# Patient Record
Sex: FEMALE | Race: BLACK OR AFRICAN AMERICAN | Employment: FULL TIME | URBAN - METROPOLITAN AREA
[De-identification: names, ages, dates, MRNs, and addresses within clinical notes are randomized per-mention and may not be internally consistent; named-entity substitution may affect disease eponyms.]

---

## 2018-06-24 ENCOUNTER — HOSPITAL ENCOUNTER (EMERGENCY)
Age: 38
Discharge: HOME | End: 2018-06-24
Payer: COMMERCIAL

## 2018-06-24 VITALS
HEART RATE: 111 BPM | RESPIRATION RATE: 18 BRPM | DIASTOLIC BLOOD PRESSURE: 106 MMHG | OXYGEN SATURATION: 96 % | SYSTOLIC BLOOD PRESSURE: 150 MMHG | BODY MASS INDEX: 32.25 KG/M2 | WEIGHT: 160 LBS | TEMPERATURE: 98 F | HEIGHT: 59 IN

## 2018-06-24 DIAGNOSIS — K08.89 TOOTHACHE: Primary | ICD-10-CM

## 2018-06-24 PROCEDURE — 6370000000 HC RX 637 (ALT 250 FOR IP): Performed by: NURSE PRACTITIONER

## 2018-06-24 PROCEDURE — 99282 EMERGENCY DEPT VISIT SF MDM: CPT

## 2018-06-24 RX ORDER — IBUPROFEN 800 MG/1
800 TABLET ORAL EVERY 8 HOURS PRN
Qty: 21 TABLET | Refills: 0 | Status: ON HOLD | OUTPATIENT
Start: 2018-06-24 | End: 2018-07-26 | Stop reason: HOSPADM

## 2018-06-24 RX ORDER — HYDROCODONE BITARTRATE AND ACETAMINOPHEN 5; 325 MG/1; MG/1
1 TABLET ORAL ONCE
Status: COMPLETED | OUTPATIENT
Start: 2018-06-24 | End: 2018-06-24

## 2018-06-24 RX ADMIN — HYDROCODONE BITARTRATE AND ACETAMINOPHEN 1 TABLET: 5; 325 TABLET ORAL at 13:36

## 2018-06-24 ASSESSMENT — PAIN SCALES - GENERAL
PAINLEVEL_OUTOF10: 8
PAINLEVEL_OUTOF10: 10

## 2018-06-24 ASSESSMENT — PAIN DESCRIPTION - ORIENTATION: ORIENTATION: RIGHT

## 2018-06-24 ASSESSMENT — PAIN DESCRIPTION - LOCATION: LOCATION: TEETH

## 2018-06-24 ASSESSMENT — PAIN DESCRIPTION - PAIN TYPE: TYPE: ACUTE PAIN

## 2018-07-25 ENCOUNTER — HOSPITAL ENCOUNTER (INPATIENT)
Age: 38
LOS: 1 days | Discharge: HOME | DRG: 425 | End: 2018-07-26
Attending: EMERGENCY MEDICINE | Admitting: INTERNAL MEDICINE
Payer: COMMERCIAL

## 2018-07-25 DIAGNOSIS — E87.6 HYPOKALEMIA: Primary | ICD-10-CM

## 2018-07-25 PROBLEM — R00.2 PALPITATIONS: Status: ACTIVE | Noted: 2018-07-25

## 2018-07-25 PROBLEM — R07.2 PRECORDIAL PAIN: Status: ACTIVE | Noted: 2018-07-25

## 2018-07-25 LAB
ANION GAP SERPL CALCULATED.3IONS-SCNC: 12 MMOL/L (ref 7–16)
BASOPHILS ABSOLUTE: 0.05 E9/L (ref 0–0.2)
BASOPHILS RELATIVE PERCENT: 0.5 % (ref 0–2)
BILIRUBIN URINE: NEGATIVE
BLOOD, URINE: NEGATIVE
BUN BLDV-MCNC: 8 MG/DL (ref 6–20)
CALCIUM SERPL-MCNC: 9.6 MG/DL (ref 8.6–10.2)
CHLORIDE BLD-SCNC: 96 MMOL/L (ref 98–107)
CHLORIDE URINE RANDOM: <20 MMOL/L
CLARITY: CLEAR
CO2: 29 MMOL/L (ref 22–29)
COLOR: YELLOW
CREAT SERPL-MCNC: 0.7 MG/DL (ref 0.5–1)
CREATININE URINE: 128 MG/DL (ref 29–226)
EOSINOPHILS ABSOLUTE: 0.06 E9/L (ref 0.05–0.5)
EOSINOPHILS RELATIVE PERCENT: 0.5 % (ref 0–6)
GFR AFRICAN AMERICAN: >60
GFR NON-AFRICAN AMERICAN: >60 ML/MIN/1.73
GLUCOSE BLD-MCNC: 94 MG/DL (ref 74–109)
GLUCOSE URINE: NEGATIVE MG/DL
HCT VFR BLD CALC: 36.9 % (ref 34–48)
HEMOGLOBIN: 12.5 G/DL (ref 11.5–15.5)
IMMATURE GRANULOCYTES #: 0.02 E9/L
IMMATURE GRANULOCYTES %: 0.2 % (ref 0–5)
KETONES, URINE: NEGATIVE MG/DL
LEUKOCYTE ESTERASE, URINE: NEGATIVE
LYMPHOCYTES ABSOLUTE: 2.76 E9/L (ref 1.5–4)
LYMPHOCYTES RELATIVE PERCENT: 25 % (ref 20–42)
MAGNESIUM: 2.1 MG/DL (ref 1.6–2.6)
MCH RBC QN AUTO: 27.8 PG (ref 26–35)
MCHC RBC AUTO-ENTMCNC: 33.9 % (ref 32–34.5)
MCV RBC AUTO: 82 FL (ref 80–99.9)
MONOCYTES ABSOLUTE: 0.7 E9/L (ref 0.1–0.95)
MONOCYTES RELATIVE PERCENT: 6.4 % (ref 2–12)
NEUTROPHILS ABSOLUTE: 7.43 E9/L (ref 1.8–7.3)
NEUTROPHILS RELATIVE PERCENT: 67.4 % (ref 43–80)
NITRITE, URINE: NEGATIVE
PDW BLD-RTO: 14.9 FL (ref 11.5–15)
PH UA: 6.5 (ref 5–9)
PLATELET # BLD: 432 E9/L (ref 130–450)
PMV BLD AUTO: 10.5 FL (ref 7–12)
POTASSIUM SERPL-SCNC: 2.7 MMOL/L (ref 3.5–5)
POTASSIUM SERPL-SCNC: 3.5 MMOL/L (ref 3.5–5)
POTASSIUM, UR: 27.2 MMOL/L
PROTEIN UA: NEGATIVE MG/DL
RBC # BLD: 4.5 E12/L (ref 3.5–5.5)
SODIUM BLD-SCNC: 137 MMOL/L (ref 132–146)
SODIUM URINE: <20 MMOL/L
SPECIFIC GRAVITY UA: <=1.005 (ref 1–1.03)
TROPONIN: <0.01 NG/ML (ref 0–0.03)
TROPONIN: <0.01 NG/ML (ref 0–0.03)
UROBILINOGEN, URINE: 0.2 E.U./DL
WBC # BLD: 11 E9/L (ref 4.5–11.5)

## 2018-07-25 PROCEDURE — 6370000000 HC RX 637 (ALT 250 FOR IP): Performed by: PHYSICIAN ASSISTANT

## 2018-07-25 PROCEDURE — 81003 URINALYSIS AUTO W/O SCOPE: CPT

## 2018-07-25 PROCEDURE — 84300 ASSAY OF URINE SODIUM: CPT

## 2018-07-25 PROCEDURE — 82436 ASSAY OF URINE CHLORIDE: CPT

## 2018-07-25 PROCEDURE — 85025 COMPLETE CBC W/AUTO DIFF WBC: CPT

## 2018-07-25 PROCEDURE — 99285 EMERGENCY DEPT VISIT HI MDM: CPT

## 2018-07-25 PROCEDURE — 99223 1ST HOSP IP/OBS HIGH 75: CPT | Performed by: HOSPITALIST

## 2018-07-25 PROCEDURE — 84484 ASSAY OF TROPONIN QUANT: CPT

## 2018-07-25 PROCEDURE — 36415 COLL VENOUS BLD VENIPUNCTURE: CPT

## 2018-07-25 PROCEDURE — 6370000000 HC RX 637 (ALT 250 FOR IP): Performed by: EMERGENCY MEDICINE

## 2018-07-25 PROCEDURE — 84132 ASSAY OF SERUM POTASSIUM: CPT

## 2018-07-25 PROCEDURE — 84133 ASSAY OF URINE POTASSIUM: CPT

## 2018-07-25 PROCEDURE — 83735 ASSAY OF MAGNESIUM: CPT

## 2018-07-25 PROCEDURE — 2580000003 HC RX 258: Performed by: INTERNAL MEDICINE

## 2018-07-25 PROCEDURE — 93005 ELECTROCARDIOGRAM TRACING: CPT | Performed by: EMERGENCY MEDICINE

## 2018-07-25 PROCEDURE — 82570 ASSAY OF URINE CREATININE: CPT

## 2018-07-25 PROCEDURE — 80048 BASIC METABOLIC PNL TOTAL CA: CPT

## 2018-07-25 PROCEDURE — 2060000000 HC ICU INTERMEDIATE R&B

## 2018-07-25 RX ORDER — ONDANSETRON 2 MG/ML
4 INJECTION INTRAMUSCULAR; INTRAVENOUS EVERY 6 HOURS PRN
Status: DISCONTINUED | OUTPATIENT
Start: 2018-07-25 | End: 2018-07-26 | Stop reason: HOSPADM

## 2018-07-25 RX ORDER — ASPIRIN 325 MG
325 TABLET ORAL ONCE
Status: COMPLETED | OUTPATIENT
Start: 2018-07-25 | End: 2018-07-25

## 2018-07-25 RX ORDER — ACETAMINOPHEN 325 MG/1
650 TABLET ORAL EVERY 4 HOURS PRN
Status: DISCONTINUED | OUTPATIENT
Start: 2018-07-25 | End: 2018-07-26 | Stop reason: HOSPADM

## 2018-07-25 RX ORDER — HYDROCHLOROTHIAZIDE 12.5 MG/1
12.5 CAPSULE, GELATIN COATED ORAL DAILY
Status: ON HOLD | COMMUNITY
End: 2018-07-26 | Stop reason: HOSPADM

## 2018-07-25 RX ORDER — MONTELUKAST SODIUM 10 MG/1
10 TABLET ORAL NIGHTLY
COMMUNITY

## 2018-07-25 RX ORDER — NIFEDIPINE 30 MG/1
30 TABLET, EXTENDED RELEASE ORAL DAILY
Status: DISCONTINUED | OUTPATIENT
Start: 2018-07-26 | End: 2018-07-26 | Stop reason: HOSPADM

## 2018-07-25 RX ORDER — CETIRIZINE HYDROCHLORIDE 10 MG/1
10 TABLET ORAL NIGHTLY
COMMUNITY
End: 2019-10-13

## 2018-07-25 RX ORDER — POTASSIUM CHLORIDE 1.5 G/1.77G
80 POWDER, FOR SOLUTION ORAL ONCE
Status: COMPLETED | OUTPATIENT
Start: 2018-07-25 | End: 2018-07-25

## 2018-07-25 RX ORDER — SODIUM CHLORIDE 0.9 % (FLUSH) 0.9 %
10 SYRINGE (ML) INJECTION EVERY 12 HOURS SCHEDULED
Status: DISCONTINUED | OUTPATIENT
Start: 2018-07-25 | End: 2018-07-26 | Stop reason: HOSPADM

## 2018-07-25 RX ORDER — SODIUM CHLORIDE 0.9 % (FLUSH) 0.9 %
10 SYRINGE (ML) INJECTION PRN
Status: DISCONTINUED | OUTPATIENT
Start: 2018-07-25 | End: 2018-07-25 | Stop reason: SDUPTHER

## 2018-07-25 RX ORDER — SODIUM CHLORIDE 0.9 % (FLUSH) 0.9 %
10 SYRINGE (ML) INJECTION PRN
Status: DISCONTINUED | OUTPATIENT
Start: 2018-07-25 | End: 2018-07-26 | Stop reason: HOSPADM

## 2018-07-25 RX ORDER — SODIUM CHLORIDE 0.9 % (FLUSH) 0.9 %
10 SYRINGE (ML) INJECTION EVERY 12 HOURS SCHEDULED
Status: DISCONTINUED | OUTPATIENT
Start: 2018-07-25 | End: 2018-07-25 | Stop reason: SDUPTHER

## 2018-07-25 RX ADMIN — POTASSIUM CHLORIDE 80 MEQ: 1.5 POWDER, FOR SOLUTION ORAL at 20:44

## 2018-07-25 RX ADMIN — Medication 10 ML: at 21:36

## 2018-07-25 RX ADMIN — Medication 10 ML: at 22:45

## 2018-07-25 RX ADMIN — ASPIRIN 325 MG: 325 TABLET ORAL at 19:00

## 2018-07-25 ASSESSMENT — PAIN SCALES - WONG BAKER: WONGBAKER_NUMERICALRESPONSE: 2

## 2018-07-25 ASSESSMENT — PAIN DESCRIPTION - PAIN TYPE: TYPE: ACUTE PAIN

## 2018-07-25 ASSESSMENT — ENCOUNTER SYMPTOMS
SORE THROAT: 0
COUGH: 0
SHORTNESS OF BREATH: 1
SINUS PRESSURE: 0
EYE REDNESS: 0
VOMITING: 0
ABDOMINAL DISTENTION: 0
DIARRHEA: 0
BACK PAIN: 0
EYE DISCHARGE: 0
WHEEZING: 0
NAUSEA: 0
EYE PAIN: 0

## 2018-07-25 ASSESSMENT — PAIN DESCRIPTION - FREQUENCY: FREQUENCY: CONTINUOUS

## 2018-07-25 ASSESSMENT — PAIN DESCRIPTION - DESCRIPTORS: DESCRIPTORS: ACHING

## 2018-07-25 ASSESSMENT — PAIN SCALES - GENERAL
PAINLEVEL_OUTOF10: 0
PAINLEVEL_OUTOF10: 5

## 2018-07-25 ASSESSMENT — PAIN DESCRIPTION - LOCATION: LOCATION: CHEST

## 2018-07-26 VITALS
SYSTOLIC BLOOD PRESSURE: 136 MMHG | WEIGHT: 158 LBS | BODY MASS INDEX: 31.85 KG/M2 | RESPIRATION RATE: 16 BRPM | HEART RATE: 82 BPM | HEIGHT: 59 IN | OXYGEN SATURATION: 96 % | TEMPERATURE: 98.4 F | DIASTOLIC BLOOD PRESSURE: 97 MMHG

## 2018-07-26 LAB
ANION GAP SERPL CALCULATED.3IONS-SCNC: 9 MMOL/L (ref 7–16)
BUN BLDV-MCNC: 8 MG/DL (ref 6–20)
CALCIUM SERPL-MCNC: 9.2 MG/DL (ref 8.6–10.2)
CHLORIDE BLD-SCNC: 100 MMOL/L (ref 98–107)
CO2: 28 MMOL/L (ref 22–29)
CREAT SERPL-MCNC: 0.8 MG/DL (ref 0.5–1)
EKG ATRIAL RATE: 87 BPM
EKG P AXIS: 63 DEGREES
EKG P-R INTERVAL: 148 MS
EKG Q-T INTERVAL: 382 MS
EKG QRS DURATION: 104 MS
EKG QTC CALCULATION (BAZETT): 459 MS
EKG R AXIS: 9 DEGREES
EKG T AXIS: 28 DEGREES
EKG VENTRICULAR RATE: 87 BPM
GFR AFRICAN AMERICAN: >60
GFR NON-AFRICAN AMERICAN: >60 ML/MIN/1.73
GLUCOSE BLD-MCNC: 95 MG/DL (ref 74–109)
POTASSIUM REFLEX MAGNESIUM: 3.7 MMOL/L (ref 3.5–5)
SODIUM BLD-SCNC: 137 MMOL/L (ref 132–146)
TROPONIN: <0.01 NG/ML (ref 0–0.03)

## 2018-07-26 PROCEDURE — 36415 COLL VENOUS BLD VENIPUNCTURE: CPT

## 2018-07-26 PROCEDURE — 2580000003 HC RX 258: Performed by: INTERNAL MEDICINE

## 2018-07-26 PROCEDURE — 99217 PR OBSERVATION CARE DISCHARGE MANAGEMENT: CPT | Performed by: HOSPITALIST

## 2018-07-26 PROCEDURE — 6370000000 HC RX 637 (ALT 250 FOR IP): Performed by: INTERNAL MEDICINE

## 2018-07-26 PROCEDURE — 93005 ELECTROCARDIOGRAM TRACING: CPT | Performed by: INTERNAL MEDICINE

## 2018-07-26 PROCEDURE — 84484 ASSAY OF TROPONIN QUANT: CPT

## 2018-07-26 PROCEDURE — 80048 BASIC METABOLIC PNL TOTAL CA: CPT

## 2018-07-26 RX ORDER — POTASSIUM CHLORIDE 20 MEQ/1
40 TABLET, EXTENDED RELEASE ORAL ONCE
Status: COMPLETED | OUTPATIENT
Start: 2018-07-26 | End: 2018-07-26

## 2018-07-26 RX ORDER — MONTELUKAST SODIUM 10 MG/1
10 TABLET ORAL NIGHTLY
Status: DISCONTINUED | OUTPATIENT
Start: 2018-07-26 | End: 2018-07-26 | Stop reason: HOSPADM

## 2018-07-26 RX ORDER — CETIRIZINE HYDROCHLORIDE 10 MG/1
10 TABLET ORAL NIGHTLY
Status: DISCONTINUED | OUTPATIENT
Start: 2018-07-26 | End: 2018-07-26 | Stop reason: HOSPADM

## 2018-07-26 RX ADMIN — NIFEDIPINE 30 MG: 30 TABLET, EXTENDED RELEASE ORAL at 08:27

## 2018-07-26 RX ADMIN — POTASSIUM CHLORIDE 40 MEQ: 20 TABLET, EXTENDED RELEASE ORAL at 02:57

## 2018-07-26 RX ADMIN — METFORMIN HYDROCHLORIDE 500 MG: 500 TABLET, FILM COATED ORAL at 08:27

## 2018-07-26 RX ADMIN — Medication 10 ML: at 08:28

## 2018-07-26 ASSESSMENT — PAIN SCALES - GENERAL
PAINLEVEL_OUTOF10: 0
PAINLEVEL_OUTOF10: 0

## 2018-07-26 NOTE — ED PROVIDER NOTES
Patient is a 26-year-old female who presents to the emergency department complaining of chest pain and shortness of breath. She states that it started last night and is mild to moderate in severity. She states she has never had symptoms of this in the past. She states that she felt lightheaded when she attempts to ambulate. Nothing has made her symptoms better or worse at home. The history is provided by the patient. Chest Pain   Pain location:  Substernal area  Pain quality: pressure    Pain radiates to:  Does not radiate  Pain severity:  Moderate  Onset quality:  Sudden  Duration:  1 day  Timing:  Constant  Progression:  Waxing and waning  Chronicity:  New  Relieved by:  Nothing  Worsened by:  Nothing  Ineffective treatments:  Rest  Associated symptoms: shortness of breath    Associated symptoms: no back pain, no cough, no fever, no headache, no nausea, no vomiting and no weakness    Shortness of Breath   Associated symptoms: chest pain    Associated symptoms: no cough, no ear pain, no fever, no headaches, no rash, no sore throat, no vomiting and no wheezing        Review of Systems   Constitutional: Negative for chills and fever. HENT: Negative for ear pain, sinus pressure and sore throat. Eyes: Negative for pain, discharge and redness. Respiratory: Positive for shortness of breath. Negative for cough and wheezing. Cardiovascular: Positive for chest pain. Gastrointestinal: Negative for abdominal distention, diarrhea, nausea and vomiting. Genitourinary: Negative for dysuria and frequency. Musculoskeletal: Negative for arthralgias and back pain. Skin: Negative for rash and wound. Neurological: Negative for weakness and headaches. Hematological: Negative for adenopathy. All other systems reviewed and are negative. Physical Exam   Constitutional: She is oriented to person, place, and time. She appears well-developed and well-nourished.    HENT:   Head: Normocephalic and -------------------------------------------------    LABS:  Results for orders placed or performed during the hospital encounter of 07/25/18   Troponin   Result Value Ref Range    Troponin <0.01 0.00 - 0.03 ng/mL   CBC Auto Differential   Result Value Ref Range    WBC 11.0 4.5 - 11.5 E9/L    RBC 4.50 3.50 - 5.50 E12/L    Hemoglobin 12.5 11.5 - 15.5 g/dL    Hematocrit 36.9 34.0 - 48.0 %    MCV 82.0 80.0 - 99.9 fL    MCH 27.8 26.0 - 35.0 pg    MCHC 33.9 32.0 - 34.5 %    RDW 14.9 11.5 - 15.0 fL    Platelets 430 506 - 762 E9/L    MPV 10.5 7.0 - 12.0 fL    Neutrophils % 67.4 43.0 - 80.0 %    Immature Granulocytes % 0.2 0.0 - 5.0 %    Lymphocytes % 25.0 20.0 - 42.0 %    Monocytes % 6.4 2.0 - 12.0 %    Eosinophils % 0.5 0.0 - 6.0 %    Basophils % 0.5 0.0 - 2.0 %    Neutrophils # 7.43 (H) 1.80 - 7.30 E9/L    Immature Granulocytes # 0.02 E9/L    Lymphocytes # 2.76 1.50 - 4.00 E9/L    Monocytes # 0.70 0.10 - 0.95 E9/L    Eosinophils # 0.06 0.05 - 0.50 E9/L    Basophils # 0.05 0.00 - 0.20 F3/S   Basic Metabolic Panel   Result Value Ref Range    Sodium 137 132 - 146 mmol/L    Potassium 2.7 (LL) 3.5 - 5.0 mmol/L    Chloride 96 (L) 98 - 107 mmol/L    CO2 29 22 - 29 mmol/L    Anion Gap 12 7 - 16 mmol/L    Glucose 94 74 - 109 mg/dL    BUN 8 6 - 20 mg/dL    CREATININE 0.7 0.5 - 1.0 mg/dL    GFR Non-African American >60 >=60 mL/min/1.73    GFR African American >60     Calcium 9.6 8.6 - 10.2 mg/dL   EKG 12 Lead   Result Value Ref Range    Ventricular Rate 87 BPM    Atrial Rate 87 BPM    P-R Interval 148 ms    QRS Duration 104 ms    Q-T Interval 382 ms    QTc Calculation (Bazett) 459 ms    P Axis 63 degrees    R Axis 9 degrees    T Axis 28 degrees       RADIOLOGY:  No orders to display       EKG:  EKG Interpretation    Interpreted by emergency department physician    Rhythm: normal sinus   Rate: normal  Axis: normal  Ectopy: none  Conduction: normal  ST Segments: no acute change  T Waves: no acute change  Q Waves: none    Clinical Impression: no acute changes    Nishant Goldsmith      ------------------------- NURSING NOTES AND VITALS REVIEWED ---------------------------  Date / Time Roomed:  7/25/2018  6:45 PM  ED Bed Assignment:  26/26    The nursing notes within the ED encounter and vital signs as below have been reviewed. Patient Vitals for the past 24 hrs:   BP Temp Temp src Pulse Resp SpO2 Height Weight   07/25/18 1944 135/89 - - 87 16 100 % - -   07/25/18 1840 (!) 168/102 98 °F (36.7 °C) Oral 97 16 98 % 4' 11\" (1.499 m) 158 lb (71.7 kg)       Oxygen Saturation Interpretation: Normal    ------------------------------------------ PROGRESS NOTES ------------------------------------------  Counseling:  I have spoken with the patient and discussed todays results, in addition to providing specific details for the plan of care and counseling regarding the diagnosis and prognosis. Their questions are answered at this time and they are agreeable with the plan of admission.    --------------------------------- ADDITIONAL PROVIDER NOTES ---------------------------------  Consultations:  Time: 8:44 PM. Spoke with Dr. Eleanor Mari. Discussed case. They will admit the patient. This patient's ED course included: a personal history and physicial examination, re-evaluation prior to disposition, cardiac monitoring, continuous pulse oximetry and complex medical decision making and emergency management    This patient has remained hemodynamically stable during their ED course. Diagnosis:  1. Hypokalemia        Disposition:  Patient's disposition: Admit to telemetry  Patient's condition is serious. ATTENDING PROVIDER ATTESTATION:     Davy Fuentes presented to the emergency department for evaluation of Chest Pain (started yesterday) and Shortness of Breath   and was initially evaluated by the Medical Resident. See Original ED Note for H&P and ED course above.      I have reviewed and discussed the case, including pertinent history (medical,

## 2018-07-26 NOTE — ED NOTES
[facility] faxed and received per Roberta Monterroso on 4th floor, ready for transport, Jose Hernandez will be nurse receiving patient and # is [address] Milind Herman RN  07/25/18 [id number]

## 2018-07-26 NOTE — H&P
[place] [id number] [facility]   History and Physical      CHIEF COMPLAINT:  Palpitations and chest pain    History of Present Illness:  40 y.o. female with a history of PCOS, essential hypertension, asthma presents with complaint of feeling generalized weakness starting yesterday associated with episodes of left posterior scapular pain with radiation into the anterior left part of her chest wall lasting for a couple minutes that just began yesterday. She would notice these episodes after ambulation or playing with her 11year-old daughter. She reports having recently been started on a diuretic by her PCP, noting that she had some lower extremity edema as well as for treatment of her hypertension. She denies any exertional symptoms of chest pain, dyspnea, palpitations prior to yesterday's episode. She reports not having any recent blood work and not taking potassium supplementation. She has never had any personal history of coronary artery disease, no family history either. Her mother does have a history of hypertension. She reports having one time episode of diarrhea within the past few days after eating some fruit, however this has not been recurrent. Informant(s) for H&P: Patient    REVIEW OF SYSTEMS:  no fevers, chills, + cp,  No sob, n/v, ha, vision/hearing changes, wt changes, hot/cold flashes, other open skin lesions, diarrhea, constipation, dysuria/hematuria unless noted in HPI. Complete ROS performed with the patient and is otherwise negative.       PMH:  Past Medical History:   Diagnosis Date    Asthma     Hypertension     PCOS (polycystic ovarian syndrome)     Thyroid disease        Surgical History:  Past Surgical History:   Procedure Laterality Date    ABDOMEN SURGERY      csection    CERVICAL CERCLAGE  2013     SECTION      LEEP      THYROIDECTOMY, PARTIAL         Medications Prior to Admission:    Prior to Admission medications    Medication Sig Start Date End or edema  Musculoskeletal: normal range of motion, no joint swelling, deformity or tenderness  Neurologic: reflexes normal and symmetric, no cranial nerve deficit, gait, coordination and speech normal      LABS:  Recent Labs      07/25/18 1906   NA  137   K  2.7*   CL  96*   CO2  29   BUN  8   CREATININE  0.7   GLUCOSE  94   CALCIUM  9.6       Recent Labs      07/25/18 1906   WBC  11.0   RBC  4.50   HGB  12.5   HCT  36.9   MCV  82.0   MCH  27.8   MCHC  33.9   RDW  14.9   PLT  432   MPV  10.5     Radiology: No results found. EKG: Normal sinus rhythm, Q waves noted in leads 3 and aVF    ASSESSMENT:      Principal Problem:    Hypokalemia  Active Problems:    Asthma    Obese    Precordial pain    Palpitations  Resolved Problems:    * No resolved hospital problems. *    PLAN:    1. Hypokalemia secondary to diuretic use: Replace potassium aggressively by mouth, EKG without rhythm change, repeat K level; suspect that she is total body depleted. Telemetry monitoring   2. Atypical chest pain with palpitations: Occurred in the setting of severe hypokalemia, suspect this is the etiology given she has virtually no risk factors for coronary artery disease and denies any progressive history of exertional symptoms. However her EKG does show inferior lead Q waves in leads 3 and aVF. Will repeat EKG in the morning and consider stress if Q waves persist as I question perhaps lead placement is playing a role. HEART score 1.  3. Essential hypertension: Continue nifedipine, she is unclear the name of the diuretic she has been taking on a daily basis but we will discontinue this during hospital and on discharge. 4. Obese:  regarding exercise and weight loss  5. PCOS: Continue metformin    Code Status: Full  DVT prophylaxis: Lovenox    Electronically signed by Myla Galvez DO on 7/25/2018 at 10:03 PM      NOTE: This report was transcribed using voice recognition software.  Every effort was made to ensure accuracy; however, inadvertent computerized transcription errors may be present.

## 2018-07-26 NOTE — PROGRESS NOTES
[facility] Quality Flow/Interdisciplinary Rounds Progress Note        Quality Flow Rounds held on July 26, 2018    Disciplines Attending:  Bedside Nurse, ,  and Nursing Unit Leadership    Madyson Potter was admitted on 7/25/2018  6:45 PM    Anticipated Discharge Date:  Expected Discharge Date: 07/27/18    Disposition:    Ritesh Score:  Ritesh Scale Score: 22    Readmission Risk              Risk of Unplanned Readmission:        9             Discussed patient goal for the day, patient clinical progression, and barriers to discharge.   The following Goal(s) of the Day/Commitment(s) have been identified:  Other  dc planning monitor lab      Nikki Casanova  July 26, 2018

## 2018-07-27 ENCOUNTER — HOSPITAL ENCOUNTER (EMERGENCY)
Age: 38
Discharge: HOME | End: 2018-07-28
Attending: EMERGENCY MEDICINE
Payer: COMMERCIAL

## 2018-07-27 ENCOUNTER — APPOINTMENT (OUTPATIENT)
Dept: GENERAL RADIOLOGY | Age: 38
End: 2018-07-27
Payer: COMMERCIAL

## 2018-07-27 DIAGNOSIS — T14.8XXA MUSCLE STRAIN: ICD-10-CM

## 2018-07-27 DIAGNOSIS — M25.512 LEFT SHOULDER PAIN, UNSPECIFIED CHRONICITY: ICD-10-CM

## 2018-07-27 DIAGNOSIS — R07.9 CHEST PAIN, UNSPECIFIED TYPE: Primary | ICD-10-CM

## 2018-07-27 LAB
EKG ATRIAL RATE: 76 BPM
EKG ATRIAL RATE: 91 BPM
EKG P AXIS: 54 DEGREES
EKG P AXIS: 62 DEGREES
EKG P-R INTERVAL: 144 MS
EKG P-R INTERVAL: 156 MS
EKG Q-T INTERVAL: 358 MS
EKG Q-T INTERVAL: 398 MS
EKG QRS DURATION: 82 MS
EKG QRS DURATION: 84 MS
EKG QTC CALCULATION (BAZETT): 440 MS
EKG QTC CALCULATION (BAZETT): 447 MS
EKG R AXIS: 47 DEGREES
EKG R AXIS: 48 DEGREES
EKG T AXIS: 21 DEGREES
EKG T AXIS: 24 DEGREES
EKG VENTRICULAR RATE: 76 BPM
EKG VENTRICULAR RATE: 91 BPM

## 2018-07-27 PROCEDURE — 99285 EMERGENCY DEPT VISIT HI MDM: CPT

## 2018-07-27 PROCEDURE — 93010 ELECTROCARDIOGRAM REPORT: CPT | Performed by: INTERNAL MEDICINE

## 2018-07-27 PROCEDURE — 71046 X-RAY EXAM CHEST 2 VIEWS: CPT

## 2018-07-27 PROCEDURE — 93005 ELECTROCARDIOGRAM TRACING: CPT | Performed by: NURSE PRACTITIONER

## 2018-07-27 ASSESSMENT — PAIN DESCRIPTION - PAIN TYPE: TYPE: ACUTE PAIN

## 2018-07-27 ASSESSMENT — PAIN SCALES - GENERAL: PAINLEVEL_OUTOF10: 4

## 2018-07-27 ASSESSMENT — PAIN DESCRIPTION - LOCATION: LOCATION: CHEST

## 2018-07-27 ASSESSMENT — PAIN DESCRIPTION - ORIENTATION: ORIENTATION: MID

## 2018-07-28 ENCOUNTER — APPOINTMENT (OUTPATIENT)
Dept: GENERAL RADIOLOGY | Age: 38
End: 2018-07-28
Payer: COMMERCIAL

## 2018-07-28 VITALS
HEIGHT: 59 IN | TEMPERATURE: 98 F | WEIGHT: 158 LBS | RESPIRATION RATE: 16 BRPM | HEART RATE: 78 BPM | SYSTOLIC BLOOD PRESSURE: 140 MMHG | OXYGEN SATURATION: 97 % | DIASTOLIC BLOOD PRESSURE: 80 MMHG | BODY MASS INDEX: 31.85 KG/M2

## 2018-07-28 LAB
ANION GAP SERPL CALCULATED.3IONS-SCNC: 13 MMOL/L (ref 7–16)
BASOPHILS ABSOLUTE: 0.05 E9/L (ref 0–0.2)
BASOPHILS RELATIVE PERCENT: 0.5 % (ref 0–2)
BUN BLDV-MCNC: 8 MG/DL (ref 6–20)
CALCIUM SERPL-MCNC: 9 MG/DL (ref 8.6–10.2)
CHLORIDE BLD-SCNC: 100 MMOL/L (ref 98–107)
CHP ED QC CHECK: NORMAL
CO2: 26 MMOL/L (ref 22–29)
CREAT SERPL-MCNC: 0.6 MG/DL (ref 0.5–1)
D DIMER: <200 NG/ML DDU
EOSINOPHILS ABSOLUTE: 0.17 E9/L (ref 0.05–0.5)
EOSINOPHILS RELATIVE PERCENT: 1.5 % (ref 0–6)
GFR AFRICAN AMERICAN: >60
GFR NON-AFRICAN AMERICAN: >60 ML/MIN/1.73
GLUCOSE BLD-MCNC: 91 MG/DL (ref 74–109)
HCT VFR BLD CALC: 37 % (ref 34–48)
HEMOGLOBIN: 12.3 G/DL (ref 11.5–15.5)
IMMATURE GRANULOCYTES #: 0.03 E9/L
IMMATURE GRANULOCYTES %: 0.3 % (ref 0–5)
LYMPHOCYTES ABSOLUTE: 3.47 E9/L (ref 1.5–4)
LYMPHOCYTES RELATIVE PERCENT: 31.6 % (ref 20–42)
MAGNESIUM: 1.7 MG/DL (ref 1.6–2.6)
MCH RBC QN AUTO: 27.7 PG (ref 26–35)
MCHC RBC AUTO-ENTMCNC: 33.2 % (ref 32–34.5)
MCV RBC AUTO: 83.3 FL (ref 80–99.9)
MONOCYTES ABSOLUTE: 0.64 E9/L (ref 0.1–0.95)
MONOCYTES RELATIVE PERCENT: 5.8 % (ref 2–12)
NEUTROPHILS ABSOLUTE: 6.62 E9/L (ref 1.8–7.3)
NEUTROPHILS RELATIVE PERCENT: 60.3 % (ref 43–80)
PDW BLD-RTO: 15.1 FL (ref 11.5–15)
PLATELET # BLD: 404 E9/L (ref 130–450)
PMV BLD AUTO: 10.5 FL (ref 7–12)
POTASSIUM SERPL-SCNC: 3.4 MMOL/L (ref 3.5–5)
PREGNANCY TEST URINE, POC: NORMAL
RBC # BLD: 4.44 E12/L (ref 3.5–5.5)
SODIUM BLD-SCNC: 139 MMOL/L (ref 132–146)
TROPONIN: <0.01 NG/ML (ref 0–0.03)
WBC # BLD: 11 E9/L (ref 4.5–11.5)

## 2018-07-28 PROCEDURE — 84484 ASSAY OF TROPONIN QUANT: CPT

## 2018-07-28 PROCEDURE — 80048 BASIC METABOLIC PNL TOTAL CA: CPT

## 2018-07-28 PROCEDURE — 85025 COMPLETE CBC W/AUTO DIFF WBC: CPT

## 2018-07-28 PROCEDURE — 96374 THER/PROPH/DIAG INJ IV PUSH: CPT

## 2018-07-28 PROCEDURE — 85378 FIBRIN DEGRADE SEMIQUANT: CPT

## 2018-07-28 PROCEDURE — 83735 ASSAY OF MAGNESIUM: CPT

## 2018-07-28 PROCEDURE — 6360000002 HC RX W HCPCS: Performed by: EMERGENCY MEDICINE

## 2018-07-28 PROCEDURE — 73030 X-RAY EXAM OF SHOULDER: CPT

## 2018-07-28 RX ORDER — KETOROLAC TROMETHAMINE 30 MG/ML
30 INJECTION, SOLUTION INTRAMUSCULAR; INTRAVENOUS ONCE
Status: COMPLETED | OUTPATIENT
Start: 2018-07-28 | End: 2018-07-28

## 2018-07-28 RX ORDER — CYCLOBENZAPRINE HYDROCHLORIDE 7.5 MG/1
7.5 TABLET, FILM COATED ORAL 3 TIMES DAILY PRN
Qty: 20 TABLET | Refills: 0 | Status: SHIPPED | OUTPATIENT
Start: 2018-07-28 | End: 2018-08-07

## 2018-07-28 RX ORDER — NAPROXEN 500 MG/1
500 TABLET ORAL 2 TIMES DAILY PRN
Qty: 20 TABLET | Refills: 0 | Status: SHIPPED | OUTPATIENT
Start: 2018-07-28 | End: 2022-10-12

## 2018-07-28 RX ADMIN — KETOROLAC TROMETHAMINE 30 MG: 30 INJECTION, SOLUTION INTRAMUSCULAR at 02:20

## 2018-07-28 ASSESSMENT — ENCOUNTER SYMPTOMS
NAUSEA: 0
ABDOMINAL DISTENTION: 0
COUGH: 0
RHINORRHEA: 0
ABDOMINAL PAIN: 0
WHEEZING: 0
EYE REDNESS: 0
DIARRHEA: 0
EYE PAIN: 0
VOMITING: 0
SINUS PRESSURE: 0
BACK PAIN: 0
SHORTNESS OF BREATH: 1
BLOOD IN STOOL: 0
SORE THROAT: 0
EYE DISCHARGE: 0

## 2018-07-28 NOTE — ED PROVIDER NOTES
Patient is a 51-year-old female here with his left upper chest pain as well as left shoulder pain that radiates to her left sided neck. Patient states pain is achy and worse with movement. She states that she had similar episode couple of days ago at which point she was found to have low potassium which was treated. She states this episode started around 7-8 PM earlier today. Patient also complains of shortness of breath with exertion. 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 She denies falls, injuries, fever, chills, sweats, history of heart problems, nausea/vomiting, wheezing, cough, abdominal pain, numbness/stinging, weakness, headache, or neck stiffness. Chest Pain   Pain location:  L chest  Pain quality: aching    Pain radiates to:  L shoulder  Duration:  5 hours  Chronicity:  Recurrent  Worsened by: Movement  Associated symptoms: shortness of breath    Associated symptoms: no abdominal pain, no back pain, no cough, no diaphoresis, no fever, no headache, no nausea, no near-syncope, no numbness, no palpitations, no syncope, no vomiting and no weakness        Review of Systems   Constitutional: Negative for chills, diaphoresis and fever. HENT: Negative for ear pain, hearing loss, rhinorrhea, sinus pressure and sore throat. Eyes: Negative for pain, discharge, redness and visual disturbance. Respiratory: Positive for shortness of breath. Negative for cough and wheezing. Cardiovascular: Positive for chest pain. Negative for palpitations, syncope and near-syncope. Gastrointestinal: Negative for abdominal distention, abdominal pain, blood in stool, diarrhea, nausea and vomiting. Genitourinary: Negative for dysuria, flank pain, frequency and hematuria. Musculoskeletal: Negative for arthralgias and back pain. Skin: Negative for rash and wound. Neurological: Negative for syncope, weakness, light-headedness, numbness and headaches. Hematological: Negative for adenopathy.    All other systems reviewed and are negative. Physical Exam   Constitutional: She is oriented to person, place, and time. She appears well-developed and well-nourished. HENT:   Head: Normocephalic and atraumatic. Head is without raccoon's eyes and without Carrillo's sign. Nose: No rhinorrhea or nasal deformity. Mouth/Throat: Uvula is midline, oropharynx is clear and moist and mucous membranes are normal.   Eyes: Conjunctivae and EOM are normal. Pupils are equal, round, and reactive to light. Neck: Trachea normal and normal range of motion. Neck supple. No JVD present. Cardiovascular: Normal rate, regular rhythm and normal heart sounds. Exam reveals no gallop. No murmur heard. Pulmonary/Chest: Effort normal and breath sounds normal. No respiratory distress. She has no wheezes. She has no rales. Left-sided chest wall tenderness to palpation and movement. Abdominal: Soft. Bowel sounds are normal. There is no tenderness. There is no rebound, no guarding and no CVA tenderness. Musculoskeletal: She exhibits no edema. Left shoulder: She exhibits tenderness and pain. She exhibits normal range of motion and no deformity. Neurological: She is alert and oriented to person, place, and time. She has normal strength. No cranial nerve deficit or sensory deficit. Skin: Skin is warm and dry. Nursing note and vitals reviewed. Procedures    MDM  Number of Diagnoses or Management Options  Chest pain, unspecified type:   Left shoulder pain, unspecified chronicity:   Muscle strain:   Diagnosis management comments: Patient had symptom medical improvement in the ED with medications. She is provided information regarding her condition. She is discharged in stable condition with instructions to follow-up with her primary care physician or return to ED if her symptoms worsen. EKG Interpretation. EKG: This EKG is signed and interpreted by me.     Rate: 91  Rhythm: Sinus  Interpretation: non-specific Result Value Ref Range    Sodium 139 132 - 146 mmol/L    Potassium 3.4 (L) 3.5 - 5.0 mmol/L    Chloride 100 98 - 107 mmol/L    CO2 26 22 - 29 mmol/L    Anion Gap 13 7 - 16 mmol/L    Glucose 91 74 - 109 mg/dL    BUN 8 6 - 20 mg/dL    CREATININE 0.6 0.5 - 1.0 mg/dL    GFR Non-African American >60 >=60 mL/min/1.73    GFR African American >60     Calcium 9.0 8.6 - 10.2 mg/dL   D-dimer, quantitative   Result Value Ref Range    D-Dimer, Quant <200 ng/mL DDU   POC Pregnancy Urine Qual   Result Value Ref Range    Preg Test, Ur neg     QC OK? ok        Radiology:  XR CHEST STANDARD (2 VW)   Final Result      XR SHOULDER LEFT (MIN 2 VIEWS)    (Results Pending)       ------------------------- NURSING NOTES AND VITALS REVIEWED ---------------------------  Date / Time Roomed:  7/27/2018 11:16 PM  ED Bed Assignment:  26/26    The nursing notes within the ED encounter and vital signs as below have been reviewed. BP (!) 140/80   Pulse 78   Temp 98 °F (36.7 °C) (Oral)   Resp 16   Ht 4' 11\" (1.499 m)   Wt 158 lb (71.7 kg)   LMP 07/09/2018   SpO2 97% Comment: while ambulating pt remained 97%-98%  BMI 31.91 kg/m²   Oxygen Saturation Interpretation: Normal      ------------------------------------------ PROGRESS NOTES ------------------------------------------  3:29 AM  Patient states that she feels better. She is not in distress. She is resting comfortably. 4:08 AM  I have spoken with the patient and discussed todays results, in addition to providing specific details for the plan of care and counseling regarding the diagnosis and prognosis. Their questions are answered at this time and they are agreeable with the plan. I discussed at length with them reasons for immediate return here for re evaluation. They will followup with their primary care physician by calling their office tomorrow.       --------------------------------- ADDITIONAL PROVIDER NOTES ---------------------------------  At this time the patient is

## 2019-04-30 ENCOUNTER — APPOINTMENT (OUTPATIENT)
Dept: CT IMAGING | Age: 39
End: 2019-04-30

## 2019-04-30 ENCOUNTER — HOSPITAL ENCOUNTER (EMERGENCY)
Age: 39
Discharge: HOME | End: 2019-04-30
Attending: EMERGENCY MEDICINE

## 2019-04-30 ENCOUNTER — APPOINTMENT (OUTPATIENT)
Dept: ULTRASOUND IMAGING | Age: 39
End: 2019-04-30

## 2019-04-30 VITALS
WEIGHT: 168 LBS | HEIGHT: 59 IN | BODY MASS INDEX: 33.87 KG/M2 | DIASTOLIC BLOOD PRESSURE: 93 MMHG | HEART RATE: 74 BPM | SYSTOLIC BLOOD PRESSURE: 153 MMHG | RESPIRATION RATE: 18 BRPM | OXYGEN SATURATION: 100 % | TEMPERATURE: 98 F

## 2019-04-30 DIAGNOSIS — R20.0 NUMBNESS AND TINGLING: Primary | ICD-10-CM

## 2019-04-30 DIAGNOSIS — R20.2 NUMBNESS AND TINGLING: Primary | ICD-10-CM

## 2019-04-30 DIAGNOSIS — M54.10 RADICULOPATHY, UNSPECIFIED SPINAL REGION: ICD-10-CM

## 2019-04-30 LAB
ALBUMIN SERPL-MCNC: 4.8 G/DL (ref 3.5–5.2)
ALP BLD-CCNC: 99 U/L (ref 35–104)
ALT SERPL-CCNC: 14 U/L (ref 0–32)
ANION GAP SERPL CALCULATED.3IONS-SCNC: 12 MMOL/L (ref 7–16)
APTT: 20.7 SEC (ref 24.5–35.1)
AST SERPL-CCNC: 20 U/L (ref 0–31)
BACTERIA: ABNORMAL /HPF
BILIRUB SERPL-MCNC: 0.2 MG/DL (ref 0–1.2)
BILIRUBIN URINE: NEGATIVE
BLOOD, URINE: NEGATIVE
BUN BLDV-MCNC: 9 MG/DL (ref 6–20)
CALCIUM SERPL-MCNC: 9.3 MG/DL (ref 8.6–10.2)
CHLORIDE BLD-SCNC: 103 MMOL/L (ref 98–107)
CLARITY: CLEAR
CO2: 23 MMOL/L (ref 22–29)
COLOR: YELLOW
CREAT SERPL-MCNC: 0.6 MG/DL (ref 0.5–1)
EKG ATRIAL RATE: 73 BPM
EKG P AXIS: 29 DEGREES
EKG P-R INTERVAL: 130 MS
EKG Q-T INTERVAL: 384 MS
EKG QRS DURATION: 82 MS
EKG QTC CALCULATION (BAZETT): 423 MS
EKG R AXIS: 45 DEGREES
EKG T AXIS: 21 DEGREES
EKG VENTRICULAR RATE: 73 BPM
EPITHELIAL CELLS, UA: ABNORMAL /HPF
GFR AFRICAN AMERICAN: >60
GFR NON-AFRICAN AMERICAN: >60 ML/MIN/1.73
GLUCOSE BLD-MCNC: 95 MG/DL (ref 74–99)
GLUCOSE URINE: NEGATIVE MG/DL
HCG, URINE, POC: NEGATIVE
HCT VFR BLD CALC: 37.4 % (ref 34–48)
HEMOGLOBIN: 11.3 G/DL (ref 11.5–15.5)
INR BLD: 1
KETONES, URINE: NEGATIVE MG/DL
LEUKOCYTE ESTERASE, URINE: NEGATIVE
Lab: NORMAL
MCH RBC QN AUTO: 22.3 PG (ref 26–35)
MCHC RBC AUTO-ENTMCNC: 30.2 % (ref 32–34.5)
MCV RBC AUTO: 73.8 FL (ref 80–99.9)
NEGATIVE QC PASS/FAIL: NORMAL
NITRITE, URINE: NEGATIVE
PDW BLD-RTO: 18.3 FL (ref 11.5–15)
PH UA: 7 (ref 5–9)
PLATELET # BLD: 463 E9/L (ref 130–450)
PMV BLD AUTO: 10.1 FL (ref 7–12)
POSITIVE QC PASS/FAIL: NORMAL
POTASSIUM SERPL-SCNC: 3.8 MMOL/L (ref 3.5–5)
PROTEIN UA: NEGATIVE MG/DL
PROTHROMBIN TIME: 11.5 SEC (ref 9.3–12.4)
RBC # BLD: 5.07 E12/L (ref 3.5–5.5)
RBC UA: ABNORMAL /HPF (ref 0–2)
SODIUM BLD-SCNC: 138 MMOL/L (ref 132–146)
SPECIFIC GRAVITY UA: 1.01 (ref 1–1.03)
TOTAL PROTEIN: 8.4 G/DL (ref 6.4–8.3)
TROPONIN: <0.01 NG/ML (ref 0–0.03)
UROBILINOGEN, URINE: 0.2 E.U./DL
WBC # BLD: 10.1 E9/L (ref 4.5–11.5)
WBC UA: ABNORMAL /HPF (ref 0–5)

## 2019-04-30 PROCEDURE — 99284 EMERGENCY DEPT VISIT MOD MDM: CPT

## 2019-04-30 PROCEDURE — 80053 COMPREHEN METABOLIC PANEL: CPT

## 2019-04-30 PROCEDURE — 85610 PROTHROMBIN TIME: CPT

## 2019-04-30 PROCEDURE — 93971 EXTREMITY STUDY: CPT

## 2019-04-30 PROCEDURE — 36415 COLL VENOUS BLD VENIPUNCTURE: CPT

## 2019-04-30 PROCEDURE — 81001 URINALYSIS AUTO W/SCOPE: CPT

## 2019-04-30 PROCEDURE — 85027 COMPLETE CBC AUTOMATED: CPT

## 2019-04-30 PROCEDURE — 93010 ELECTROCARDIOGRAM REPORT: CPT | Performed by: INTERNAL MEDICINE

## 2019-04-30 PROCEDURE — 70450 CT HEAD/BRAIN W/O DYE: CPT

## 2019-04-30 PROCEDURE — 93005 ELECTROCARDIOGRAM TRACING: CPT | Performed by: NURSE PRACTITIONER

## 2019-04-30 PROCEDURE — 85730 THROMBOPLASTIN TIME PARTIAL: CPT

## 2019-04-30 PROCEDURE — 84484 ASSAY OF TROPONIN QUANT: CPT

## 2019-04-30 ASSESSMENT — PAIN SCALES - GENERAL: PAINLEVEL_OUTOF10: 7

## 2019-04-30 ASSESSMENT — PAIN DESCRIPTION - PAIN TYPE: TYPE: ACUTE PAIN

## 2019-04-30 ASSESSMENT — PAIN DESCRIPTION - ORIENTATION: ORIENTATION: RIGHT

## 2019-04-30 NOTE — ED PROVIDER NOTES
Potassium 3.8 3.5 - 5.0 mmol/L    Chloride 103 98 - 107 mmol/L    CO2 23 22 - 29 mmol/L    Anion Gap 12 7 - 16 mmol/L    Glucose 95 74 - 99 mg/dL    BUN 9 6 - 20 mg/dL    CREATININE 0.6 0.5 - 1.0 mg/dL    GFR Non-African American >60 >=60 mL/min/1.73    GFR African American >60     Calcium 9.3 8.6 - 10.2 mg/dL    Total Protein 8.4 (H) 6.4 - 8.3 g/dL    Alb 4.8 3.5 - 5.2 g/dL    Total Bilirubin 0.2 0.0 - 1.2 mg/dL    Alkaline Phosphatase 99 35 - 104 U/L    ALT 14 0 - 32 U/L    AST 20 0 - 31 U/L   Troponin   Result Value Ref Range    Troponin <0.01 0.00 - 0.03 ng/mL   Urinalysis with Microscopic   Result Value Ref Range    Color, UA Yellow Straw/Yellow    Clarity, UA Clear Clear    Glucose, Ur Negative Negative mg/dL    Bilirubin Urine Negative Negative    Ketones, Urine Negative Negative mg/dL    Specific Gravity, UA 1.010 1.005 - 1.030    Blood, Urine Negative Negative    pH, UA 7.0 5.0 - 9.0    Protein, UA Negative Negative mg/dL    Urobilinogen, Urine 0.2 <2.0 E.U./dL    Nitrite, Urine Negative Negative    Leukocyte Esterase, Urine Negative Negative    WBC, UA NONE 0 - 5 /HPF    RBC, UA NONE 0 - 2 /HPF    Epi Cells FEW /HPF    Bacteria, UA RARE (A) /HPF   APTT   Result Value Ref Range    aPTT 20.7 (L) 24.5 - 35.1 sec   Protime-INR   Result Value Ref Range    Protime 11.5 9.3 - 12.4 sec    INR 1.0    POC Pregnancy Urine Qual   Result Value Ref Range    HCG, Urine, POC Negative Negative    Lot Number [id number]     Positive QC Pass/Fail Acceptable     Negative QC Pass/Fail Acceptable    EKG 12 Lead   Result Value Ref Range    Ventricular Rate 73 BPM    Atrial Rate 73 BPM    P-R Interval 130 ms    QRS Duration 82 ms    Q-T Interval 384 ms    QTc Calculation (Bazett) 423 ms    P Axis 29 degrees    R Axis 45 degrees    T Axis 21 degrees     Imaging: All Radiology results interpreted by Radiologist unless otherwise noted.   US DUP LOWER EXTREMITY RIGHT AAKASH   Final Result      No evidence for deep vein thrombosis of the right lower extremity. CT Head WO Contrast   Final Result   1. No CT evidence of acute intracranial abnormality, as questioned. If   there is clinical concern for potential underlying acute   cerebrovascular infarction/accident or other potential abnormalities   of underlying brain parenchyma, MRI of the brain would be recommended   for more detailed evaluation. Alise January HEART Score For Major Cardiac Events  (Max Score 10 Points)  HISTORY       [x]   Slightly Suspicious  0       []   Moderately Suspicious  +1       []   Highly Suspicious  +2    EK point: No ST depression but LBBB, LVH repolarization changes (ex:digoxin);               2 points: ST depression/elevation not due to LBBB, LVH or digoxin         [x]   Normal  0       []   Nonspecific Repolarization Disturbance  +1       []   Significant ST Depression  +2    AGE       [x]   <45  0       []   45-64  +1       []    >65  +2    RISK FACTORS:  1. HTN    2. Hypercholesterolemia    3. DM     4. Cigarette smoking (current or cessation < 3 mos)    5. Positive family history  (parent or sibling with CVD before age 72). 6. Obesity (BMI >30kg/m2)         []   No Risk factors Known  0       [x]   1-2 Risk Factors  +1       []   >3 Risk Factors or History of Atherosclerotic Disease  +2      INITIAL TROPONIN       [x]   < Normal Limit   0       []   1-3 x Normal Limit   +1       []   >3 x Normal Limit   +2     -----------------------------------------------------------------------------------------------------------------  SCORE TOTAL:  1 POINTS     Low Score          (0-3 Points), risk of MACE of 0.9-1.7% (discuss d/c home with f/u)  Moderate Score (4-6 Points), risk of MACE of 12-16.6% (discuss admission for        further testing)  High Score         (7-10 Points), risk of MACE of 50-65% (Admit ALL as they are        candidates for early invasive measures)  EKG #1:  Interpreted by emergency department physician unless otherwise noted.   Time:  3916 Rate: 73  Rhythm: Sinus. Interpretation: normal sinus rhythm. EKG reviewed with Dr Mario Blanchard and compared to EKG 7/27/2018    ED Course / Medical Decision Making   Medications - No data to display     Re-examination:  4/30/19       Time: Dr Mario Blanchard at bedside      Consult(s):   None    Procedure(s):   none    MDM:   Felton Negro is a 45 yr old female who presents to the ED for c/o R leg numbness that started on Thursday. She was seen at her PCP yesterday given a script for prednisone which she did not start yet. This morning she developed numbness to her face and R arm. There has been no extremity weakness. Neurologically intact, no motor or sensory deficits. Ct head no acute findings. CBC no leukocytosis, CMP unremarkable, urine pregnancy negative. Troponin <0.01 urinalysis negative for UTI. US of RLE negative for DVT. HEART score 1. She was instructed to take the prednisone that was prescribed by her PCP. Advised to follow up with her PCP and return for any new, changing, worsening symptoms or concerns. Counseling: The emergency provider has spoken with the patient and discussed todays results, in addition to providing specific details for the plan of care and counseling regarding the diagnosis and prognosis. Questions are answered at this time and they are agreeable with the plan. Assessment      1. Numbness and tingling    2. Radiculopathy, unspecified spinal region      Plan   Discharge to home  Patient condition is good    New Medications     Discharge Medication List as of 4/30/2019  3:45 PM        Electronically signed by STEPHANI Segura CNP   DD: 4/30/19  **This report was transcribed using voice recognition software. Every effort was made to ensure accuracy; however, inadvertent computerized transcription errors may be present.   END OF ED PROVIDER NOTE        STEPHANI Segura CNP  05/01/19 [id number]

## 2019-10-13 ENCOUNTER — HOSPITAL ENCOUNTER (EMERGENCY)
Age: 39
Discharge: HOME | End: 2019-10-13
Attending: EMERGENCY MEDICINE
Payer: COMMERCIAL

## 2019-10-13 ENCOUNTER — APPOINTMENT (OUTPATIENT)
Dept: CT IMAGING | Age: 39
End: 2019-10-13
Payer: COMMERCIAL

## 2019-10-13 VITALS
RESPIRATION RATE: 16 BRPM | HEART RATE: 83 BPM | HEIGHT: 59 IN | DIASTOLIC BLOOD PRESSURE: 94 MMHG | SYSTOLIC BLOOD PRESSURE: 139 MMHG | WEIGHT: 165 LBS | BODY MASS INDEX: 33.26 KG/M2 | OXYGEN SATURATION: 98 % | TEMPERATURE: 98.4 F

## 2019-10-13 DIAGNOSIS — I10 ESSENTIAL HYPERTENSION: Primary | ICD-10-CM

## 2019-10-13 DIAGNOSIS — G44.209 TENSION HEADACHE: ICD-10-CM

## 2019-10-13 LAB
ALBUMIN SERPL-MCNC: 4.9 G/DL (ref 3.5–5.2)
ALP BLD-CCNC: 85 U/L (ref 35–104)
ALT SERPL-CCNC: 16 U/L (ref 0–32)
ANION GAP SERPL CALCULATED.3IONS-SCNC: 12 MMOL/L (ref 7–16)
AST SERPL-CCNC: 42 U/L (ref 0–31)
BILIRUB SERPL-MCNC: <0.2 MG/DL (ref 0–1.2)
BILIRUBIN URINE: NEGATIVE
BLOOD, URINE: NEGATIVE
BUN BLDV-MCNC: 13 MG/DL (ref 6–20)
CALCIUM SERPL-MCNC: 9.8 MG/DL (ref 8.6–10.2)
CHLORIDE BLD-SCNC: 96 MMOL/L (ref 98–107)
CLARITY: CLEAR
CO2: 25 MMOL/L (ref 22–29)
COLOR: YELLOW
CREAT SERPL-MCNC: 0.8 MG/DL (ref 0.5–1)
GFR AFRICAN AMERICAN: >60
GFR NON-AFRICAN AMERICAN: >60 ML/MIN/1.73
GLUCOSE BLD-MCNC: 145 MG/DL (ref 74–99)
GLUCOSE URINE: NEGATIVE MG/DL
HCG, URINE, POC: NEGATIVE
HCT VFR BLD CALC: 36.4 % (ref 34–48)
HEMOGLOBIN: 11 G/DL (ref 11.5–15.5)
KETONES, URINE: NEGATIVE MG/DL
LEUKOCYTE ESTERASE, URINE: NEGATIVE
Lab: NORMAL
MCH RBC QN AUTO: 23.2 PG (ref 26–35)
MCHC RBC AUTO-ENTMCNC: 30.2 % (ref 32–34.5)
MCV RBC AUTO: 76.8 FL (ref 80–99.9)
NEGATIVE QC PASS/FAIL: NORMAL
NITRITE, URINE: NEGATIVE
PDW BLD-RTO: 17.5 FL (ref 11.5–15)
PH UA: 8 (ref 5–9)
PLATELET # BLD: 565 E9/L (ref 130–450)
PMV BLD AUTO: 10.6 FL (ref 7–12)
POSITIVE QC PASS/FAIL: NORMAL
POTASSIUM SERPL-SCNC: 4.5 MMOL/L (ref 3.5–5)
PROTEIN UA: NEGATIVE MG/DL
RBC # BLD: 4.74 E12/L (ref 3.5–5.5)
SODIUM BLD-SCNC: 133 MMOL/L (ref 132–146)
SPECIFIC GRAVITY UA: 1.01 (ref 1–1.03)
TOTAL PROTEIN: 8.5 G/DL (ref 6.4–8.3)
UROBILINOGEN, URINE: 0.2 E.U./DL
WBC # BLD: 21 E9/L (ref 4.5–11.5)

## 2019-10-13 PROCEDURE — 99284 EMERGENCY DEPT VISIT MOD MDM: CPT

## 2019-10-13 PROCEDURE — 81003 URINALYSIS AUTO W/O SCOPE: CPT

## 2019-10-13 PROCEDURE — 96375 TX/PRO/DX INJ NEW DRUG ADDON: CPT

## 2019-10-13 PROCEDURE — 6360000002 HC RX W HCPCS: Performed by: EMERGENCY MEDICINE

## 2019-10-13 PROCEDURE — 80053 COMPREHEN METABOLIC PANEL: CPT

## 2019-10-13 PROCEDURE — 2580000003 HC RX 258: Performed by: EMERGENCY MEDICINE

## 2019-10-13 PROCEDURE — 87088 URINE BACTERIA CULTURE: CPT

## 2019-10-13 PROCEDURE — 85027 COMPLETE CBC AUTOMATED: CPT

## 2019-10-13 PROCEDURE — 87040 BLOOD CULTURE FOR BACTERIA: CPT

## 2019-10-13 PROCEDURE — 70450 CT HEAD/BRAIN W/O DYE: CPT

## 2019-10-13 PROCEDURE — 96374 THER/PROPH/DIAG INJ IV PUSH: CPT

## 2019-10-13 PROCEDURE — 93005 ELECTROCARDIOGRAM TRACING: CPT | Performed by: EMERGENCY MEDICINE

## 2019-10-13 RX ORDER — HYDROCHLOROTHIAZIDE 12.5 MG/1
12.5 CAPSULE, GELATIN COATED ORAL DAILY
COMMUNITY
End: 2019-11-13

## 2019-10-13 RX ORDER — METOCLOPRAMIDE HYDROCHLORIDE 5 MG/ML
10 INJECTION INTRAMUSCULAR; INTRAVENOUS ONCE
Status: COMPLETED | OUTPATIENT
Start: 2019-10-13 | End: 2019-10-13

## 2019-10-13 RX ORDER — DIPHENHYDRAMINE HYDROCHLORIDE 50 MG/ML
25 INJECTION INTRAMUSCULAR; INTRAVENOUS ONCE
Status: DISCONTINUED | OUTPATIENT
Start: 2019-10-13 | End: 2019-10-14 | Stop reason: HOSPADM

## 2019-10-13 RX ORDER — DIPHENHYDRAMINE HYDROCHLORIDE 50 MG/ML
25 INJECTION INTRAMUSCULAR; INTRAVENOUS ONCE
Status: COMPLETED | OUTPATIENT
Start: 2019-10-13 | End: 2019-10-13

## 2019-10-13 RX ORDER — DEXAMETHASONE SODIUM PHOSPHATE 10 MG/ML
6 INJECTION INTRAMUSCULAR; INTRAVENOUS ONCE
Status: DISCONTINUED | OUTPATIENT
Start: 2019-10-13 | End: 2019-10-13

## 2019-10-13 RX ORDER — LISINOPRIL 20 MG/1
20 TABLET ORAL DAILY
COMMUNITY
End: 2019-11-13

## 2019-10-13 RX ORDER — BUTALBITAL, ACETAMINOPHEN AND CAFFEINE 300; 40; 50 MG/1; MG/1; MG/1
1 CAPSULE ORAL EVERY 4 HOURS PRN
Qty: 18 CAPSULE | Refills: 0 | Status: SHIPPED | OUTPATIENT
Start: 2019-10-13 | End: 2019-11-13

## 2019-10-13 RX ORDER — 0.9 % SODIUM CHLORIDE 0.9 %
2000 INTRAVENOUS SOLUTION INTRAVENOUS ONCE
Status: COMPLETED | OUTPATIENT
Start: 2019-10-13 | End: 2019-10-13

## 2019-10-13 RX ORDER — HYDRALAZINE HYDROCHLORIDE 20 MG/ML
10 INJECTION INTRAMUSCULAR; INTRAVENOUS ONCE
Status: DISCONTINUED | OUTPATIENT
Start: 2019-10-13 | End: 2019-10-13

## 2019-10-13 RX ADMIN — SODIUM CHLORIDE 2000 ML: 9 INJECTION, SOLUTION INTRAVENOUS at 21:26

## 2019-10-13 RX ADMIN — METOCLOPRAMIDE 10 MG: 5 INJECTION, SOLUTION INTRAMUSCULAR; INTRAVENOUS at 21:01

## 2019-10-13 RX ADMIN — DIPHENHYDRAMINE HYDROCHLORIDE 25 MG: 50 INJECTION, SOLUTION INTRAMUSCULAR; INTRAVENOUS at 21:04

## 2019-10-13 ASSESSMENT — ENCOUNTER SYMPTOMS
BLOOD IN STOOL: 0
CHEST TIGHTNESS: 0
BACK PAIN: 0
DIARRHEA: 0
SHORTNESS OF BREATH: 0
COLOR CHANGE: 0
VOMITING: 0
NAUSEA: 0
ABDOMINAL PAIN: 0
COUGH: 0

## 2019-10-14 LAB
EKG ATRIAL RATE: 88 BPM
EKG P AXIS: 66 DEGREES
EKG P-R INTERVAL: 146 MS
EKG Q-T INTERVAL: 374 MS
EKG QRS DURATION: 82 MS
EKG QTC CALCULATION (BAZETT): 452 MS
EKG R AXIS: 41 DEGREES
EKG T AXIS: 15 DEGREES
EKG VENTRICULAR RATE: 88 BPM

## 2019-10-14 PROCEDURE — 93010 ELECTROCARDIOGRAM REPORT: CPT | Performed by: INTERNAL MEDICINE

## 2019-10-16 LAB — URINE CULTURE, ROUTINE: NORMAL

## 2019-10-19 LAB
BLOOD CULTURE, ROUTINE: NORMAL
CULTURE, BLOOD 2: NORMAL

## 2019-11-13 ENCOUNTER — NURSE ONLY (OUTPATIENT)
Dept: CARDIOLOGY CLINIC | Age: 39
End: 2019-11-13

## 2019-11-13 ENCOUNTER — OFFICE VISIT (OUTPATIENT)
Dept: CARDIOLOGY CLINIC | Age: 39
End: 2019-11-13
Payer: COMMERCIAL

## 2019-11-13 VITALS
HEIGHT: 59 IN | DIASTOLIC BLOOD PRESSURE: 88 MMHG | HEART RATE: 74 BPM | WEIGHT: 171 LBS | SYSTOLIC BLOOD PRESSURE: 136 MMHG | RESPIRATION RATE: 16 BRPM | BODY MASS INDEX: 34.47 KG/M2

## 2019-11-13 DIAGNOSIS — J45.40 MODERATE PERSISTENT ASTHMA WITHOUT COMPLICATION: ICD-10-CM

## 2019-11-13 DIAGNOSIS — E66.9 NON MORBID OBESITY: ICD-10-CM

## 2019-11-13 DIAGNOSIS — R00.2 PALPITATIONS: Primary | ICD-10-CM

## 2019-11-13 DIAGNOSIS — E28.2 PCOS (POLYCYSTIC OVARIAN SYNDROME): ICD-10-CM

## 2019-11-13 DIAGNOSIS — E78.5 DYSLIPIDEMIA: ICD-10-CM

## 2019-11-13 DIAGNOSIS — I10 ESSENTIAL HYPERTENSION: ICD-10-CM

## 2019-11-13 DIAGNOSIS — Z86.39 HISTORY OF GOITER: ICD-10-CM

## 2019-11-13 PROCEDURE — G8427 DOCREV CUR MEDS BY ELIG CLIN: HCPCS | Performed by: INTERNAL MEDICINE

## 2019-11-13 PROCEDURE — 93000 ELECTROCARDIOGRAM COMPLETE: CPT | Performed by: INTERNAL MEDICINE

## 2019-11-13 PROCEDURE — G8417 CALC BMI ABV UP PARAM F/U: HCPCS | Performed by: INTERNAL MEDICINE

## 2019-11-13 PROCEDURE — 1036F TOBACCO NON-USER: CPT | Performed by: INTERNAL MEDICINE

## 2019-11-13 PROCEDURE — G8484 FLU IMMUNIZE NO ADMIN: HCPCS | Performed by: INTERNAL MEDICINE

## 2019-11-13 PROCEDURE — 99204 OFFICE O/P NEW MOD 45 MIN: CPT | Performed by: INTERNAL MEDICINE

## 2019-11-13 RX ORDER — FLUTICASONE PROPIONATE 220 UG/1
1 AEROSOL, METERED RESPIRATORY (INHALATION) 2 TIMES DAILY
COMMUNITY

## 2019-11-13 RX ORDER — LISINOPRIL AND HYDROCHLOROTHIAZIDE 25; 20 MG/1; MG/1
1 TABLET ORAL DAILY
COMMUNITY
End: 2022-10-12

## 2019-11-22 ENCOUNTER — TELEPHONE (OUTPATIENT)
Dept: CARDIOLOGY CLINIC | Age: 39
End: 2019-11-22

## 2019-11-26 DIAGNOSIS — R00.2 PALPITATIONS: ICD-10-CM

## 2019-12-04 ENCOUNTER — HOSPITAL ENCOUNTER (OUTPATIENT)
Dept: CARDIOLOGY | Age: 39
Discharge: HOME | End: 2019-12-04
Payer: COMMERCIAL

## 2019-12-04 VITALS
HEIGHT: 59 IN | HEART RATE: 98 BPM | DIASTOLIC BLOOD PRESSURE: 80 MMHG | SYSTOLIC BLOOD PRESSURE: 130 MMHG | OXYGEN SATURATION: 98 % | WEIGHT: 171 LBS | BODY MASS INDEX: 34.47 KG/M2

## 2019-12-04 DIAGNOSIS — R00.2 PALPITATIONS: ICD-10-CM

## 2019-12-04 DIAGNOSIS — E78.5 DYSLIPIDEMIA: ICD-10-CM

## 2019-12-04 DIAGNOSIS — I10 ESSENTIAL HYPERTENSION: ICD-10-CM

## 2019-12-04 PROCEDURE — 93016 CV STRESS TEST SUPVJ ONLY: CPT | Performed by: INTERNAL MEDICINE

## 2019-12-04 PROCEDURE — 93017 CV STRESS TEST TRACING ONLY: CPT

## 2019-12-04 PROCEDURE — 93018 CV STRESS TEST I&R ONLY: CPT | Performed by: INTERNAL MEDICINE

## 2021-04-09 ENCOUNTER — APPOINTMENT (OUTPATIENT)
Dept: CT IMAGING | Age: 41
End: 2021-04-09
Payer: COMMERCIAL

## 2021-04-09 ENCOUNTER — APPOINTMENT (OUTPATIENT)
Dept: GENERAL RADIOLOGY | Age: 41
End: 2021-04-09
Payer: COMMERCIAL

## 2021-04-09 ENCOUNTER — HOSPITAL ENCOUNTER (EMERGENCY)
Age: 41
Discharge: HOME | End: 2021-04-09
Attending: EMERGENCY MEDICINE
Payer: COMMERCIAL

## 2021-04-09 VITALS
RESPIRATION RATE: 16 BRPM | BODY MASS INDEX: 34.68 KG/M2 | WEIGHT: 172 LBS | OXYGEN SATURATION: 98 % | DIASTOLIC BLOOD PRESSURE: 86 MMHG | TEMPERATURE: 97.1 F | SYSTOLIC BLOOD PRESSURE: 121 MMHG | HEIGHT: 59 IN | HEART RATE: 85 BPM

## 2021-04-09 DIAGNOSIS — R42 DIZZINESS: ICD-10-CM

## 2021-04-09 DIAGNOSIS — E87.6 HYPOKALEMIA: ICD-10-CM

## 2021-04-09 DIAGNOSIS — R42 LIGHTHEADEDNESS: Primary | ICD-10-CM

## 2021-04-09 LAB
ANION GAP SERPL CALCULATED.3IONS-SCNC: 9 MMOL/L (ref 7–16)
BASOPHILS ABSOLUTE: 0.06 E9/L (ref 0–0.2)
BASOPHILS RELATIVE PERCENT: 0.7 % (ref 0–2)
BILIRUBIN URINE: NEGATIVE
BLOOD, URINE: NEGATIVE
BUN BLDV-MCNC: 10 MG/DL (ref 6–20)
CALCIUM SERPL-MCNC: 9.3 MG/DL (ref 8.6–10.2)
CHLORIDE BLD-SCNC: 99 MMOL/L (ref 98–107)
CLARITY: CLEAR
CO2: 29 MMOL/L (ref 22–29)
COLOR: YELLOW
CREAT SERPL-MCNC: 0.7 MG/DL (ref 0.5–1)
EKG ATRIAL RATE: 71 BPM
EKG P AXIS: 24 DEGREES
EKG P-R INTERVAL: 162 MS
EKG Q-T INTERVAL: 420 MS
EKG QRS DURATION: 96 MS
EKG QTC CALCULATION (BAZETT): 456 MS
EKG R AXIS: 43 DEGREES
EKG T AXIS: 27 DEGREES
EKG VENTRICULAR RATE: 71 BPM
EOSINOPHILS ABSOLUTE: 0.21 E9/L (ref 0.05–0.5)
EOSINOPHILS RELATIVE PERCENT: 2.5 % (ref 0–6)
GFR AFRICAN AMERICAN: >60
GFR NON-AFRICAN AMERICAN: >60 ML/MIN/1.73
GLUCOSE BLD-MCNC: 104 MG/DL (ref 74–99)
GLUCOSE URINE: NEGATIVE MG/DL
HCG, URINE, POC: NEGATIVE
HCT VFR BLD CALC: 37 % (ref 34–48)
HEMOGLOBIN: 12.2 G/DL (ref 11.5–15.5)
IMMATURE GRANULOCYTES #: 0.02 E9/L
IMMATURE GRANULOCYTES %: 0.2 % (ref 0–5)
KETONES, URINE: NEGATIVE MG/DL
LEUKOCYTE ESTERASE, URINE: NEGATIVE
LYMPHOCYTES ABSOLUTE: 2.09 E9/L (ref 1.5–4)
LYMPHOCYTES RELATIVE PERCENT: 24.4 % (ref 20–42)
Lab: NORMAL
MCH RBC QN AUTO: 27.2 PG (ref 26–35)
MCHC RBC AUTO-ENTMCNC: 33 % (ref 32–34.5)
MCV RBC AUTO: 82.4 FL (ref 80–99.9)
MONOCYTES ABSOLUTE: 0.56 E9/L (ref 0.1–0.95)
MONOCYTES RELATIVE PERCENT: 6.5 % (ref 2–12)
NEGATIVE QC PASS/FAIL: NORMAL
NEUTROPHILS ABSOLUTE: 5.62 E9/L (ref 1.8–7.3)
NEUTROPHILS RELATIVE PERCENT: 65.7 % (ref 43–80)
NITRITE, URINE: NEGATIVE
PDW BLD-RTO: 17.8 FL (ref 11.5–15)
PH UA: 8 (ref 5–9)
PLATELET # BLD: 394 E9/L (ref 130–450)
PMV BLD AUTO: 11.2 FL (ref 7–12)
POSITIVE QC PASS/FAIL: NORMAL
POTASSIUM SERPL-SCNC: 3.2 MMOL/L (ref 3.5–5)
PROTEIN UA: NEGATIVE MG/DL
RBC # BLD: 4.49 E12/L (ref 3.5–5.5)
SODIUM BLD-SCNC: 137 MMOL/L (ref 132–146)
SPECIFIC GRAVITY UA: 1.02 (ref 1–1.03)
TROPONIN: <0.01 NG/ML (ref 0–0.03)
UROBILINOGEN, URINE: 0.2 E.U./DL
WBC # BLD: 8.6 E9/L (ref 4.5–11.5)

## 2021-04-09 PROCEDURE — 70450 CT HEAD/BRAIN W/O DYE: CPT

## 2021-04-09 PROCEDURE — 71045 X-RAY EXAM CHEST 1 VIEW: CPT

## 2021-04-09 PROCEDURE — 2580000003 HC RX 258: Performed by: EMERGENCY MEDICINE

## 2021-04-09 PROCEDURE — 93005 ELECTROCARDIOGRAM TRACING: CPT | Performed by: EMERGENCY MEDICINE

## 2021-04-09 PROCEDURE — 93010 ELECTROCARDIOGRAM REPORT: CPT | Performed by: INTERNAL MEDICINE

## 2021-04-09 PROCEDURE — 85025 COMPLETE CBC W/AUTO DIFF WBC: CPT

## 2021-04-09 PROCEDURE — 81003 URINALYSIS AUTO W/O SCOPE: CPT

## 2021-04-09 PROCEDURE — 6370000000 HC RX 637 (ALT 250 FOR IP): Performed by: EMERGENCY MEDICINE

## 2021-04-09 PROCEDURE — 84484 ASSAY OF TROPONIN QUANT: CPT

## 2021-04-09 PROCEDURE — 80048 BASIC METABOLIC PNL TOTAL CA: CPT

## 2021-04-09 PROCEDURE — 99284 EMERGENCY DEPT VISIT MOD MDM: CPT

## 2021-04-09 RX ORDER — MECLIZINE HYDROCHLORIDE 25 MG/1
25 TABLET ORAL 3 TIMES DAILY PRN
Qty: 15 TABLET | Refills: 1 | Status: SHIPPED | OUTPATIENT
Start: 2021-04-09 | End: 2021-04-19

## 2021-04-09 RX ORDER — 0.9 % SODIUM CHLORIDE 0.9 %
1000 INTRAVENOUS SOLUTION INTRAVENOUS ONCE
Status: COMPLETED | OUTPATIENT
Start: 2021-04-09 | End: 2021-04-09

## 2021-04-09 RX ORDER — MECLIZINE HCL 12.5 MG/1
25 TABLET ORAL ONCE
Status: COMPLETED | OUTPATIENT
Start: 2021-04-09 | End: 2021-04-09

## 2021-04-09 RX ORDER — POTASSIUM CHLORIDE 20 MEQ/1
40 TABLET, EXTENDED RELEASE ORAL ONCE
Status: COMPLETED | OUTPATIENT
Start: 2021-04-09 | End: 2021-04-09

## 2021-04-09 RX ADMIN — POTASSIUM CHLORIDE 40 MEQ: 1500 TABLET, EXTENDED RELEASE ORAL at 11:33

## 2021-04-09 RX ADMIN — SODIUM CHLORIDE 1000 ML: 9 INJECTION, SOLUTION INTRAVENOUS at 10:22

## 2021-04-09 RX ADMIN — MECLIZINE 25 MG: 12.5 TABLET ORAL at 10:23

## 2021-04-09 ASSESSMENT — ENCOUNTER SYMPTOMS
ABDOMINAL PAIN: 0
BLOOD IN STOOL: 0
PHOTOPHOBIA: 0
NAUSEA: 0
COUGH: 0
DIARRHEA: 0
SHORTNESS OF BREATH: 0
COLOR CHANGE: 0

## 2021-04-09 ASSESSMENT — PAIN SCALES - GENERAL: PAINLEVEL_OUTOF10: 5

## 2021-04-09 NOTE — ED PROVIDER NOTES
James Moncada is a 36 y.o. female presenting to the ED for dizzyness, lightheadedmess, chest palpitations, beginning 0800 ago. The complaint has been intermittent, moderate in severity, and worsened by nothing. 37 yo f who notes woke up this am with lightheadedness and dizzyness wher the room is spinning worse when rotating head to the right. Pt noted \"chest fluttering\" this am as well. Pt denies any chest pain now. Pt denies sob, cough, fever, lost of taste or smell, diarrhea, vomiting, headache, vision changes, focal weakness or numbness, urinary sx. Pt notes mentstrual periods are normal.    Review of Systems:   Review of Systems   Constitutional: Negative for chills, diaphoresis and fatigue. HENT: Negative for congestion, postnasal drip and sneezing. Eyes: Negative for photophobia and visual disturbance. Respiratory: Negative for cough and shortness of breath. Cardiovascular: Positive for palpitations. Negative for chest pain. Gastrointestinal: Negative for abdominal pain, blood in stool, diarrhea and nausea. Endocrine: Negative for polyphagia and polyuria. Genitourinary: Negative for difficulty urinating, enuresis and flank pain. Musculoskeletal: Negative for arthralgias and gait problem. Skin: Negative for color change and rash. Allergic/Immunologic: Negative for food allergies and immunocompromised state. Neurological: Positive for dizziness and light-headedness. Negative for seizures, weakness, numbness and headaches. Hematological: Negative for adenopathy. Does not bruise/bleed easily. Psychiatric/Behavioral: Negative for agitation and confusion.                 --------------------------------------------- PAST HISTORY ---------------------------------------------  Past Medical History:  has a past medical history of Asthma, Hypertension, PCOS (polycystic ovarian syndrome), and Thyroid disease.     Past Surgical History:  has a past surgical history that includes  section; LEEP; Abdomen surgery; Cervical Cerclage (2013); and Thyroidectomy, partial.    Social History:  reports that she has never smoked. She has never used smokeless tobacco. She reports current alcohol use. She reports that she does not use drugs. Family History: family history includes Diabetes in her mother; High Blood Pressure in her mother. The patients home medications have been reviewed.     Allergies: Latex and Food    -------------------------------------------------- RESULTS -------------------------------------------------  All laboratory and radiology results have been personally reviewed by myself   LABS:  Results for orders placed or performed during the hospital encounter of 21   CBC auto differential   Result Value Ref Range    WBC 8.6 4.5 - 11.5 E9/L    RBC 4.49 3.50 - 5.50 E12/L    Hemoglobin 12.2 11.5 - 15.5 g/dL    Hematocrit 37.0 34.0 - 48.0 %    MCV 82.4 80.0 - 99.9 fL    MCH 27.2 26.0 - 35.0 pg    MCHC 33.0 32.0 - 34.5 %    RDW 17.8 (H) 11.5 - 15.0 fL    Platelets 806 874 - 756 E9/L    MPV 11.2 7.0 - 12.0 fL    Neutrophils % 65.7 43.0 - 80.0 %    Immature Granulocytes % 0.2 0.0 - 5.0 %    Lymphocytes % 24.4 20.0 - 42.0 %    Monocytes % 6.5 2.0 - 12.0 %    Eosinophils % 2.5 0.0 - 6.0 %    Basophils % 0.7 0.0 - 2.0 %    Neutrophils Absolute 5.62 1.80 - 7.30 E9/L    Immature Granulocytes # 0.02 E9/L    Lymphocytes Absolute 2.09 1.50 - 4.00 E9/L    Monocytes Absolute 0.56 0.10 - 0.95 E9/L    Eosinophils Absolute 0.21 0.05 - 0.50 E9/L    Basophils Absolute 0.06 0.00 - 0.20 Q1/W   Basic metabolic panel   Result Value Ref Range    Sodium 137 132 - 146 mmol/L    Potassium 3.2 (L) 3.5 - 5.0 mmol/L    Chloride 99 98 - 107 mmol/L    CO2 29 22 - 29 mmol/L    Anion Gap 9 7 - 16 mmol/L    Glucose 104 (H) 74 - 99 mg/dL    BUN 10 6 - 20 mg/dL    CREATININE 0.7 0.5 - 1.0 mg/dL    GFR Non-African American >60 >=60 mL/min/1.73    GFR African American >60     Calcium 9.3 8.6 - 10.2 Pupils: Pupils are equal, round, and reactive to light. Neck:      Musculoskeletal: Normal range of motion and neck supple. No muscular tenderness. Cardiovascular:      Rate and Rhythm: Normal rate and regular rhythm. Pulses: Normal pulses. Heart sounds: No murmur. Pulmonary:      Effort: Pulmonary effort is normal.      Breath sounds: No wheezing or rhonchi. Chest:      Chest wall: No tenderness. Abdominal:      General: Bowel sounds are normal.      Tenderness: There is no abdominal tenderness. There is no right CVA tenderness, left CVA tenderness or guarding. Musculoskeletal:         General: No swelling or deformity. Skin:     General: Skin is warm and dry. Capillary Refill: Capillary refill takes less than 2 seconds. Neurological:      General: No focal deficit present. Mental Status: She is alert and oriented to person, place, and time. GCS: GCS eye subscore is 4. GCS verbal subscore is 5. GCS motor subscore is 6. Cranial Nerves: Cranial nerves are intact. No cranial nerve deficit or dysarthria. Sensory: Sensation is intact. No sensory deficit. Motor: Motor function is intact. No weakness or tremor. Coordination: Coordination is intact. Romberg sign negative. Coordination normal. Finger-Nose-Finger Test and Heel to [facility] Test normal. Rapid alternating movements normal.      Gait: Gait is intact. Gait normal.   Psychiatric:         Mood and Affect: Mood normal.               ------------------------------ ED COURSE/MEDICAL DECISION MAKING----------------------  Medications   potassium chloride (KLOR-CON M) extended release tablet 40 mEq (has no administration in time range)   0.9 % sodium chloride bolus (1,000 mLs Intravenous New Bag 4/9/21 1022)   meclizine (ANTIVERT) tablet 25 mg (25 mg Oral Given 4/9/21 1023)     EKG: This EKG is signed and interpreted by me.     [id number]  Rate: 71  Rhythm: Sinus  Interpretation: no acute changes  Comparison: stable as compared to patient's most recent EKG      ED COURSE:         HEART Score For Major Cardiac Events  (Max Score 10 Points)  HISTORY       [x]   Slightly Suspicious  0       []   Moderately Suspicious  +1       []   Highly Suspicious  +2    EK point: No ST depression but LBBB, LVH repolarization changes (ex:digoxin);               2 points: ST depression/elevation not due to LBBB, LVH or digoxin         [x]   Normal  0       []   Nonspecific Repolarization Disturbance  +1       []   Significant ST Depression  +2    AGE       [x]   <45  0       []   45-64  +1       []    >65  +2    RISK FACTORS:  1. HTN    2. Hypercholesterolemia    3. DM     4. Cigarette smoking (current or cessation < 3 mos)    5. Positive family history  (parent or sibling with CVD before age 72). 6. Obesity (BMI >30kg/m2)         []   No Risk factors Known  0       [x]   1-2 Risk Factors  +1       []   >3 Risk Factors or History of Atherosclerotic Disease  +2      INITIAL TROPONIN       [x]   < Normal Limit   0       []   1-3 x Normal Limit   +1       []   >3 x Normal Limit   +2     -----------------------------------------------------------------------------------------------------------------  SCORE TOTAL: 1 POINTS     Low Score          (0-3 Points), risk of MACE of 0.9-1.7% (discuss d/c home with f/u)  Moderate Score (4-6 Points), risk of MACE of 12-16.6% (discuss admission for        further testing)  High Score         (7-10 Points), risk of MACE of 50-65% (Admit ALL as they are        candidates for early invasive measures)  PERC Rule for PE for Age <50:      Age ?  50 negative     HR ? 100 negative     O2 Sat on Room Air < 95% negative     Prior History of DVT/PE negative     Recent Trauma or Surgery negative     Hemoptysis negative     Exogenous Estrogen/Hormone Use negative     Unilateral Extgremity Swelling negative     * If ANY Criteria are positive, the PERC rule is not satisfied and cannot be used to rule out PE in this patient. Medical Decision Making:    Exam wnml, testing unremarkable, ecg shows no ischemic changes, perc and heart scores were low risk. CT unremarkable for cva, ich or mass. Pt to be discharged on meclizine and f/w family. Pt likely with BPV  Risks and benefits were discussed with patient for All medications dispensed and given in department as well prescriptions prescribed for home, . The patient elected to take the medicine. Pt instructed on warning signs and precautions for medication side effects. The patient was given warning signs for when to seek medical attention. Counseled regarding todays diagnosis, including possible risks and complications,  especially if left uncontrolled. Counseled regarding the possible side effects, risks, benefits and alternatives to treatment; patient and/or guardian verbalizes understanding, agrees, feels comfortable with and wishes to proceed with treatment plan. Advised patient to call her primary care physician with any new medication issues, and read all Rx info from pharmacy to assure aware of all possible risks and side effects of medication before taking. I did discuss warning signs for when to return to the Emergency Room, and the patient verbalized understanding      Counseling: The emergency provider has spoken with the patient and discussed todays results, in addition to providing specific details for the plan of care and counseling regarding the diagnosis and prognosis. Questions are answered at this time and they are agreeable with the plan.      --------------------------------- IMPRESSION AND DISPOSITION ---------------------------------    IMPRESSION  1. Lightheadedness    2. Dizziness    3. Hypokalemia        DISPOSITION  Disposition: Discharge to home  Patient condition is fair      NOTE: This report was transcribed using voice recognition software.  Every effort was made to ensure accuracy; however, inadvertent computerized transcription errors may be present       Faith Guillen DO  04/09/21 1134

## 2022-10-12 ENCOUNTER — OFFICE VISIT (OUTPATIENT)
Dept: BARIATRICS/WEIGHT MGMT | Age: 42
End: 2022-10-12
Payer: COMMERCIAL

## 2022-10-12 VITALS
BODY MASS INDEX: 36.16 KG/M2 | TEMPERATURE: 98.4 F | HEART RATE: 90 BPM | WEIGHT: 184.2 LBS | SYSTOLIC BLOOD PRESSURE: 133 MMHG | HEIGHT: 60 IN | DIASTOLIC BLOOD PRESSURE: 85 MMHG

## 2022-10-12 DIAGNOSIS — E66.09 CLASS 2 OBESITY DUE TO EXCESS CALORIES WITHOUT SERIOUS COMORBIDITY WITH BODY MASS INDEX (BMI) OF 36.0 TO 36.9 IN ADULT: ICD-10-CM

## 2022-10-12 DIAGNOSIS — I10 ESSENTIAL HYPERTENSION: Primary | ICD-10-CM

## 2022-10-12 PROCEDURE — G8484 FLU IMMUNIZE NO ADMIN: HCPCS | Performed by: INTERNAL MEDICINE

## 2022-10-12 PROCEDURE — G8417 CALC BMI ABV UP PARAM F/U: HCPCS | Performed by: INTERNAL MEDICINE

## 2022-10-12 PROCEDURE — 1036F TOBACCO NON-USER: CPT | Performed by: INTERNAL MEDICINE

## 2022-10-12 PROCEDURE — G8427 DOCREV CUR MEDS BY ELIG CLIN: HCPCS | Performed by: INTERNAL MEDICINE

## 2022-10-12 PROCEDURE — 99204 OFFICE O/P NEW MOD 45 MIN: CPT | Performed by: INTERNAL MEDICINE

## 2022-10-12 PROCEDURE — 99202 OFFICE O/P NEW SF 15 MIN: CPT

## 2022-10-12 RX ORDER — ACETAMINOPHEN 160 MG
TABLET,DISINTEGRATING ORAL
COMMUNITY
Start: 2022-07-05

## 2022-10-12 RX ORDER — LISINOPRIL 20 MG/1
TABLET ORAL
COMMUNITY
Start: 2022-09-28

## 2022-10-12 RX ORDER — PHENTERMINE HYDROCHLORIDE 37.5 MG/1
37.5 TABLET ORAL
Qty: 30 TABLET | Refills: 0 | Status: SHIPPED | OUTPATIENT
Start: 2022-10-12 | End: 2022-11-11

## 2022-10-12 NOTE — PATIENT INSTRUCTIONS
Rules:  Count every calorie every day (you can use free marylu such as 'baritastic' or 'nutritionix track')  Limit sweets to one day per month  Limit chips/crackers/pretzels/nuts/popcorn to 100 micheal/day  Eliminate all sugar sweetened beverages (including fruit juice)  Limit restaurants (including fast food and food from a convenience store) to one time every two weeks while in town    Requirements:  Make sure protein intake is at least 65 grams per day (do not count protein every day; instead spot check your intake every 2-3 weeks and make sure what you think you are getting is close to accurate; consider using a protein shake if needed; these are in the pharmacy section of the stores, not the grocery section; Premier, Pure Protein and Fairlife are relatively inexpensive and taste good to most patients; other options are Nectar, Boost Max, Ensure Max, BeneProtein and GNC lean (which is lactose-free); Nectar fruit, Premier Protein Clear, IsoPure Protein Drink, and Protein 2 O are water-based options; Quest (or Cosco, which is cheaper and is ordered on [employer]) and the Wello protein bars can also be used, but have less protein in them ) (<200 Micheal, >25 g protein) - (chicken, fish, turkey, egg white)  (Disclaimer: Dietary supplements rarely have their listed ingredients and the amount of each verified by a third party other. Sometimes they give verification for their claims to be GMO and gluten free and to be organic. However, even such verifications as these may still be untrustworthy.)  Make sure that fiber intake is at least 22 grams per day. Do this by either eating 12 tablespoons of the original, plain Fiber One cereal every day or 4 tablespoons of wheat dextrin powder (Benefiber or a generic brand) every day. Work up to this amount slowly by starting with only one-eighth to one-fourth of the target amount and then adding another one-eighth to one-fourth every one or two weeks until reaching the target.   Take one multivitamin every day    Targets:  Limit calorie intake to 1200 calories/day  Walk 30 minutes daily  Avoid eating 2 hours within bedtime. Tips:  Do not eat outside of the dining room or the kitchen  Do not eat while watching TV, videos, working on the computer or using a smart phone  Do not eat food out of a multi-serving bag or container. Phentermine:  Take phentermine 37.5 mg, one-half to one tablet daily as needed for appetite suppression. Take each dose 30-90 min before effect will be needed. While taking phentermine, check the Blood Pressure every morning and every evening. If the systolic BP is >655 mmHg, the diastolic BP is >80 mm/Hg or the heart rate is > 100 beats per minute, do not take phentermine that day. If the systolic BP is consistently >155 mmHg, the diastolic BP is consistently above 90 mm/Hg or the heart rate is consistently > 100 beats per minute, then stop taking phentermine altogether. If the systolic BP >427 mmHg or the diastolic BP is >015 mmHg (even if it is only once), then phentermine should be stopped altogether without proving that any of these are consistently elevated. Follow up in 30 days.

## 2022-10-12 NOTE — PROGRESS NOTES
CC -   HTN, Obesity    BACKGROUND -   First visit: 10/12/22    Obesity (all weight in lbs)  Began 4-5  Initial Ht 59.5, Wt 184.2,  BMI 36.58  HS Grad wt 120   Lowest   wt 120   Highest  wt 187 (7-8 yrs ago)  Pattern of wt gain: gradual  Wt change past yr: +7-8 lbs  Most wt lost: 15 lbs   Other diets attempted: [employer], exercise    Desire to lose weight: 10/10 (no surgery)    Initial Diet:    Number of meals per day - 3    Number of snacks per day - 2    Meal volume - 12\" plate,  rarely seconds    Fast food/convenience store - 7x/week    Restaurants (not fast food) - 0-1x/week   Sweets - 5d/week   Chips - 0d/week   Crackers/pretzels - 2d/week (pretzels)   Nuts - 0-1d/week   Peanut Butter - 1-2d/week   Popcorn - 0-1d/week   Dried fruit - 0d/week   Whole fruit - 7d/week (2-3 servings/day)   Breakfast cereal - 0d/week   Granola/Protein/Energy bar - 0d/week   Sugar sweetened beverages - DD refresher 2-3x/wk (130 Micheal), no pop/soda, orange juice ~4 oz 2-3x/wk, no coffee, no energy drink   Protein - Naked pea protein powder 1 scoop 5x/d in orange juice+water+fruit+kale   Fiber - No supplements    Wt effect of HR foods = 2100 Micheal/wk = 300 Micheal/d= 17% DEN = 31 lb/year. Initial Exercise:    Gym membership - [facility] - 1x/wk for wateraerobics Sat 1 hr    Walking - 3x/wk - 1.5-2 miles    Running - no    Resistance - no    Aerobic class - no    ______________________    [facility] [place] [facility] -  Past Medical History:   Diagnosis Date    Asthma     Hypertension     PCOS (polycystic ovarian syndrome)     Thyroid disease      Past Surgical History:   Procedure Laterality Date    ABDOMEN SURGERY      csection    CERVICAL CERCLAGE  2013     SECTION      LEEP      THYROIDECTOMY, PARTIAL       Prior to Admission medications    Medication Sig Start Date End Date Taking? Authorizing Provider   phentermine (ADIPEX-P) 37.5 MG tablet Take 1 tablet by mouth every morning (before breakfast) for 30 days.  10/12/22 11/11/22 Yes Shauna Castañeda MD lisinopril (PRINIVIL;ZESTRIL) 20 MG tablet take 1 tablet by mouth once daily 9/28/22   Historical Provider, MD   Cholecalciferol (VITAMIN D3) 50 MCG (2000 UT) CAPS take 1 capsule by mouth once daily 7/5/22   Historical Provider, MD   fluticasone (FLOVENT HFA) 220 MCG/ACT inhaler Inhale 1 puff into the lungs 2 times daily    Historical Provider, MD   Cetirizine HCl 10 MG CAPS Take by mouth daily    Historical Provider, MD   montelukast (SINGULAIR) 10 MG tablet Take 10 mg by mouth nightly    Historical Provider, MD   albuterol sulfate HFA (PROVENTIL HFA) 108 (90 Base) MCG/ACT inhaler Inhale 2 puffs into the lungs every 4 hours as needed for Wheezing 11/26/17 4/9/21  EUGENIO Mullins   metFORMIN (GLUCOPHAGE) 500 MG tablet Take 500 mg by mouth 2 times daily (with meals)    Historical Provider, MD   Norvasc 5 mg daily. Fish oil 4 daily, and iron tablet once a day. Metformin for PCOS    Allergies: Allergies   Allergen Reactions    Latex Itching    Food      fish   Tolerates fish oil. Social history: smoking: no ; Alcohol: occasional wine , work: [employer]    ROS -  Card - no CP  GI - no N/V/D/C    PE -  Gen : /85 (Site: Left Upper Arm, Position: Sitting, Cuff Size: Large Adult)   Pulse 90   Temp 98.4 °F (36.9 °C) (Temporal)   Ht 4' 11.5\" (1.511 m)   Wt 184 lb 3.2 oz (83.6 kg)   LMP 09/26/2022   BMI 36.58 kg/m²    WN, WD, NAD  Lung: Nml resp effort, CTA B/L  Heart:  RRR w/o MGR, no LE pitting edema b/l  Psych: Normal mood   Full affect  Neuro: Moves all ext well  ______________________    HISTORY & ASSESSMENT/PLAN -     Problem 1 - Hypertension   HPI   - controlled on current medications(Lisinopril, Amlodipine), patient asymptomatic. Assessment  - controlled  Plan   - Continue medications. Weight reduction can help de-escalate doses.  Weight reduction per plan below      Problem 2  - Obesity   HPI   - See above Background for description    Weight  Date    184.2  10/12/22    Patient's estimated daily energy need (DEN) = 1776 Micheal/d = 76896 Micheal/wk    Assessment  - Uncontrolled    Plan   - Talked about various options. Does not want VLCD. Would like calorie counting with low calorie diet as detailed below. Would like an appetite suppressant, and after talking about options and side effects, opted for Phentermine (had phentermine in the past and tolerated, and had worked). Planned as follows:  Patient Instructions   Rules:  Count every calorie every day (you can use free marylu such as 'Broota' or 'nutritionix track')  Limit sweets to one day per month  Limit chips/crackers/pretzels/nuts/popcorn to 100 micheal/day  Eliminate all sugar sweetened beverages (including fruit juice)  Limit restaurants (including fast food and food from a convenience store) to one time every two weeks while in town    Requirements:  Make sure protein intake is at least 65 grams per day (do not count protein every day; instead spot check your intake every 2-3 weeks and make sure what you think you are getting is close to accurate; consider using a protein shake if needed; these are in the pharmacy section of the stores, not the grocery section; Premier, Pure Protein and Fairlife are relatively inexpensive and taste good to most patients; other options are Nectar, Boost Max, Ensure Max, BeneProtein and GNC lean (which is lactose-free); Nectar fruit, Premier Protein Clear, IsoPure Protein Drink, and Protein 2 O are water-based options; Quest (or Cosco, which is cheaper and is ordered on [address] [facility] [address]) and the Oh Yeah 1 protein bars can also be used, but have less protein in them ) (<200 Micheal, >25 g protein) - (chicken, fish, turkey, egg white)  (Disclaimer: Dietary supplements rarely have their listed ingredients and the amount of each verified by a third party other. Sometimes they give verification for their claims to be GMO and gluten free and to be organic.  However, even such verifications as these may still be untrustworthy.)  Make sure that fiber intake is at least 22 grams per day. Do this by either eating 12 tablespoons of the original, plain Fiber One cereal every day or 4 tablespoons of wheat dextrin powder (Benefiber or a generic brand) every day. Work up to this amount slowly by starting with only one-eighth to one-fourth of the target amount and then adding another one-eighth to one-fourth every one or two weeks until reaching the target. Take one multivitamin every day    Targets:  Limit calorie intake to 1200 calories/day  Walk 30 minutes daily  Avoid eating 2 hours within bedtime. Tips:  Do not eat outside of the dining room or the kitchen  Do not eat while watching TV, videos, working on the computer or using a smart phone  Do not eat food out of a multi-serving bag or container. Phentermine:  Take phentermine 37.5 mg, one-half to one tablet daily as needed for appetite suppression. Take each dose 30-90 min before effect will be needed. While taking phentermine, check the Blood Pressure every morning and every evening. If the systolic BP is >938 mmHg, the diastolic BP is >46 mm/Hg or the heart rate is > 100 beats per minute, do not take phentermine that day. If the systolic BP is consistently >155 mmHg, the diastolic BP is consistently above 90 mm/Hg or the heart rate is consistently > 100 beats per minute, then stop taking phentermine altogether. If the systolic BP >275 mmHg or the diastolic BP is >715 mmHg (even if it is only once), then phentermine should be stopped altogether without proving that any of these are consistently elevated. Follow up in 30 days. Total time spent on encounter: 46 min. Ivania Solomon MD  Internal Medicine/Obesity Medicine  10/12/2022.

## 2022-11-16 ENCOUNTER — OFFICE VISIT (OUTPATIENT)
Dept: BARIATRICS/WEIGHT MGMT | Age: 42
End: 2022-11-16
Payer: COMMERCIAL

## 2022-11-16 VITALS
TEMPERATURE: 98.4 F | WEIGHT: 176 LBS | BODY MASS INDEX: 34.55 KG/M2 | HEIGHT: 60 IN | DIASTOLIC BLOOD PRESSURE: 83 MMHG | HEART RATE: 102 BPM | SYSTOLIC BLOOD PRESSURE: 133 MMHG

## 2022-11-16 DIAGNOSIS — I10 ESSENTIAL HYPERTENSION: Primary | ICD-10-CM

## 2022-11-16 DIAGNOSIS — E66.09 CLASS 1 OBESITY DUE TO EXCESS CALORIES WITHOUT SERIOUS COMORBIDITY WITH BODY MASS INDEX (BMI) OF 34.0 TO 34.9 IN ADULT: ICD-10-CM

## 2022-11-16 PROCEDURE — 99214 OFFICE O/P EST MOD 30 MIN: CPT | Performed by: INTERNAL MEDICINE

## 2022-11-16 PROCEDURE — 3074F SYST BP LT 130 MM HG: CPT | Performed by: INTERNAL MEDICINE

## 2022-11-16 PROCEDURE — 99211 OFF/OP EST MAY X REQ PHY/QHP: CPT

## 2022-11-16 PROCEDURE — G8484 FLU IMMUNIZE NO ADMIN: HCPCS | Performed by: INTERNAL MEDICINE

## 2022-11-16 PROCEDURE — 1036F TOBACCO NON-USER: CPT | Performed by: INTERNAL MEDICINE

## 2022-11-16 PROCEDURE — G8417 CALC BMI ABV UP PARAM F/U: HCPCS | Performed by: INTERNAL MEDICINE

## 2022-11-16 PROCEDURE — 3078F DIAST BP <80 MM HG: CPT | Performed by: INTERNAL MEDICINE

## 2022-11-16 PROCEDURE — G8428 CUR MEDS NOT DOCUMENT: HCPCS | Performed by: INTERNAL MEDICINE

## 2022-11-16 RX ORDER — FERROUS SULFATE 325(65) MG
TABLET ORAL
COMMUNITY
Start: 2022-11-09

## 2022-11-16 RX ORDER — OMEGA-3-ACID ETHYL ESTERS 1 G/1
CAPSULE, LIQUID FILLED ORAL
COMMUNITY
Start: 2022-11-10

## 2022-11-16 RX ORDER — AMLODIPINE BESYLATE 5 MG/1
TABLET ORAL
COMMUNITY
Start: 2022-10-12

## 2022-11-16 RX ORDER — PHENTERMINE HYDROCHLORIDE 37.5 MG/1
37.5 TABLET ORAL
Qty: 30 TABLET | Refills: 0 | Status: SHIPPED | OUTPATIENT
Start: 2022-11-16 | End: 2022-12-16

## 2022-11-16 NOTE — PATIENT INSTRUCTIONS
Rules:  Count every calorie every day  Limit sweets to one day per month  Limit chips/crackers/pretzels/nuts/popcorn to 100 [place]aja/day  Eliminate all sugar sweetened beverages (including fruit juice)  Limit restaurants (including fast food and food from a convenience store) to one time every two weeks while in town    Requirements:  Make sure protein intake is at least 65 grams per day  Make sure that fiber intake is at least 22 grams per day  Take one multivitamin every day    Targets:  Limit calorie intake to 1200 calories/day  Walk 30 minutes daily  Avoid eating 2 hours within bedtime. Tips:  Do not eat outside of the dining room or the kitchen  Do not eat while watching TV, videos, working on the computer or using a smart phone  Do not eat food out of a multi-serving bag or container. Phentermine:  Take phentermine 37.5 mg, one-half to one tablet daily as needed for appetite suppression. Take each dose 30-90 min before effect will be needed. While taking phentermine, check the Blood Pressure every morning and every evening. If the systolic BP is >179 mmHg, the diastolic BP is >43 mm/Hg or the heart rate is > 100 beats per minute, do not take phentermine that day. If the systolic BP is consistently >155 mmHg, the diastolic BP is consistently above 90 mm/Hg or the heart rate is consistently > 100 beats per minute, then stop taking phentermine altogether. If the systolic BP >139 mmHg or the diastolic BP is >875 mmHg (even if it is only once), then phentermine should be stopped altogether without proving that any of these are consistently elevated. Follow up in 30 days.

## 2022-11-16 NOTE — PROGRESS NOTES
CC -   Follow up of: HTN, Obesity    BACKGROUND -   Last visit: 10/12/22  First visit: 10/12/22    Obesity (all weight in lbs)  Began 4-5  Initial Ht 59.5, Wt 184.2,  BMI 36.58  HS Grad wt 120   Lowest   wt 120   Highest  wt 187 (7-8 yrs ago)  Pattern of wt gain: gradual  Wt change past yr: +7-8 lbs  Most wt lost: 15 lbs   Other diets attempted: [employer], exercise    Desire to lose weight: 10/10 (no surgery)    Initial Diet:    Number of meals per day - 3    Number of snacks per day - 2    Meal volume - 12\" plate,  rarely seconds    Fast food/convenience store - 7x/week    Restaurants (not fast food) - 0-1x/week   Sweets - 5d/week   Chips - 0d/week   Crackers/pretzels - 2d/week (pretzels)   Nuts - 0-1d/week   Peanut Butter - 1-2d/week   Popcorn - 0-1d/week   Dried fruit - 0d/week   Whole fruit - 7d/week (2-3 servings/day)   Breakfast cereal - 0d/week   Granola/Protein/Energy bar - 0d/week   Sugar sweetened beverages - DD refresher 2-3x/wk (130 Micheal), no pop/soda, orange juice ~4 oz 2-3x/wk, no coffee, no energy drink   Protein - Naked pea protein powder 1 scoop 5x/d in orange juice+water+fruit+kale   Fiber - No supplements    Wt effect of HR foods = 2100 Micheal/wk = 300 Micheal/d= 17% DEN = 31 lb/year. Initial Exercise:    Gym membership - [facility] - 1x/wk for wateraerobics Sat 1 hr    Walking - 3x/wk - 1.5-2 miles    Running - no    Resistance - no    Aerobic class - no    ______________________    [facility] -  Past Medical History:   Diagnosis Date    Asthma     Hypertension     PCOS (polycystic ovarian syndrome)     Thyroid disease      Past Surgical History:   Procedure Laterality Date    ABDOMEN SURGERY      csection    CERVICAL CERCLAGE  2013     SECTION      LEEP      THYROIDECTOMY, PARTIAL       Prior to Admission medications    Medication Sig Start Date End Date Taking?  Authorizing Provider   amLODIPine (NORVASC) 5 MG tablet take 1 tablet by mouth once daily 10/12/22   Historical Provider, MD   FEROSUL 332 (09 Fe) MG tablet take 1 tablet by mouth daily 11/9/22   Historical Provider, MD   omega-3 acid ethyl esters (LOVAZA) 1 g capsule take 2 capsules by mouth twice a day 11/10/22   Historical Provider, MD   lisinopril (PRINIVIL;ZESTRIL) 20 MG tablet take 1 tablet by mouth once daily 9/28/22   Historical Provider, MD   Cholecalciferol (VITAMIN D3) 50 MCG (2000 UT) CAPS take 1 capsule by mouth once daily 7/5/22   Historical Provider, MD   fluticasone (FLOVENT HFA) 220 MCG/ACT inhaler Inhale 1 puff into the lungs 2 times daily    Historical Provider, MD   Cetirizine HCl 10 MG CAPS Take by mouth daily    Historical Provider, MD   montelukast (SINGULAIR) 10 MG tablet Take 10 mg by mouth nightly    Historical Provider, MD   albuterol sulfate HFA (PROVENTIL HFA) 108 (90 Base) MCG/ACT inhaler Inhale 2 puffs into the lungs every 4 hours as needed for Wheezing 11/26/17 4/9/21  EUGENIO Marroquin   metFORMIN (GLUCOPHAGE) 500 MG tablet Take 500 mg by mouth 2 times daily (with meals)    Historical Provider, MD   Norvasc 5 mg daily. Fish oil 4 daily, and iron tablet once a day. Metformin for PCOS    Allergies: Allergies   Allergen Reactions    Latex Itching    Food      fish   Tolerates fish oil. Social history: smoking: no ; Alcohol: occasional wine , work: [employer].    ROS -  Card - no CP  GI - no N/V/D/C    PE -  Gen : /83 (Site: Left Upper Arm, Position: Sitting, Cuff Size: Large Adult)   Pulse (!) 102   Temp 98.4 °F (36.9 °C) (Temporal)   Ht 4' 11.5\" (1.511 m)   Wt 176 lb (79.8 kg)   LMP 11/11/2022 (Exact Date)   BMI 34.95 kg/m²    WN, WD, NAD  Lung: Nml resp effort, CTA B/L  Heart:  RRR w/o MGR, no LE pitting edema b/l  Psych: Normal mood   Full affect  Neuro: Moves all ext well  ______________________    HISTORY & ASSESSMENT/PLAN -     Problem 1 - Hypertension   HPI   - controlled on current medications(Lisinopril, Amlodipine), patient asymptomatic. Assessment  - controlled  Plan   - Continue medications. Weight reduction can help de-escalate doses. Weight reduction per plan below    Problem 2  - Obesity   HPI   - See above Background for description    Weight  Date    184.2  10/12/22 Phentermine #1    176.0  22 Phentermine #2    Total weight change to date: -8.2 lbs. Average daily energy variance:  10/16/2022 - 2022: -6.6 lbs (90478 Micheal)/30 d = -770 Micheal/d deficit. Patient's estimated daily energy need (DEN) = 1776 Micheal/d = 32010 Micheal/wk    Update:  Calorie monitorind/wk, usual intake  Micheal/d  Sweets: 4d/month  SSB: none  Restaurant food: 0x/wk  Appetite suppressant: phentermine 37.5 mg, half tablet daily, appetite suppression: 7/10; side effects: dry mouth  Home BP monitoring: bid, smt diastolic in 78C  Protein: protein shake, chicken, egg whites  Fiber: 1/2 cup of quinoa/day + apple+kale  Water: 8 cups  Activities: 3x/wk at gym, at home 2x/wk (~1 hr each)    Assessment  - improving    Plan   - She is doing fairly well with her current plan and would like to continue as follows:  Patient Instructions   Rules:  Count every calorie every day  Limit sweets to one day per month  Limit chips/crackers/pretzels/nuts/popcorn to 100 micheal/day  Eliminate all sugar sweetened beverages (including fruit juice)  Limit restaurants (including fast food and food from a convenience store) to one time every two weeks while in town    Requirements:  Make sure protein intake is at least 65 grams per day  Make sure that fiber intake is at least 22 grams per day  Take one multivitamin every day    Targets:  Limit calorie intake to 1200 calories/day  Walk 30 minutes daily  Avoid eating 2 hours within bedtime. Tips:  Do not eat outside of the dining room or the kitchen  Do not eat while watching TV, videos, working on the computer or using a smart phone  Do not eat food out of a multi-serving bag or container.     Phentermine:  Take phentermine 37.5 mg, one-half to one tablet daily as needed for appetite suppression. Take each dose 30-90 min before effect will be needed. While taking phentermine, check the Blood Pressure every morning and every evening. If the systolic BP is >796 mmHg, the diastolic BP is >48 mm/Hg or the heart rate is > 100 beats per minute, do not take phentermine that day. If the systolic BP is consistently >155 mmHg, the diastolic BP is consistently above 90 mm/Hg or the heart rate is consistently > 100 beats per minute, then stop taking phentermine altogether. If the systolic BP >752 mmHg or the diastolic BP is >232 mmHg (even if it is only once), then phentermine should be stopped altogether without proving that any of these are consistently elevated. Follow up in 30 days. Medication management: Phentermine    Yogi Wong MD  Internal Medicine/Obesity Medicine  11/16/2022.

## 2022-11-27 ENCOUNTER — APPOINTMENT (OUTPATIENT)
Dept: ULTRASOUND IMAGING | Age: 42
End: 2022-11-27
Payer: COMMERCIAL

## 2022-11-27 ENCOUNTER — HOSPITAL ENCOUNTER (EMERGENCY)
Age: 42
Discharge: HOME | End: 2022-11-27
Attending: EMERGENCY MEDICINE
Payer: COMMERCIAL

## 2022-11-27 VITALS
SYSTOLIC BLOOD PRESSURE: 146 MMHG | HEIGHT: 59 IN | WEIGHT: 172 LBS | HEART RATE: 77 BPM | TEMPERATURE: 98.8 F | RESPIRATION RATE: 18 BRPM | OXYGEN SATURATION: 99 % | BODY MASS INDEX: 34.68 KG/M2 | DIASTOLIC BLOOD PRESSURE: 96 MMHG

## 2022-11-27 DIAGNOSIS — D25.9 UTERINE LEIOMYOMA, UNSPECIFIED LOCATION: Primary | ICD-10-CM

## 2022-11-27 LAB
ALBUMIN SERPL-MCNC: 4.2 G/DL (ref 3.5–5.2)
ALP BLD-CCNC: 82 U/L (ref 35–104)
ALT SERPL-CCNC: 16 U/L (ref 0–32)
ANION GAP SERPL CALCULATED.3IONS-SCNC: 9 MMOL/L (ref 7–16)
AST SERPL-CCNC: 16 U/L (ref 0–31)
BACTERIA: ABNORMAL /HPF
BASOPHILS ABSOLUTE: 0.06 E9/L (ref 0–0.2)
BASOPHILS RELATIVE PERCENT: 0.5 % (ref 0–2)
BILIRUB SERPL-MCNC: <0.2 MG/DL (ref 0–1.2)
BILIRUBIN URINE: NEGATIVE
BLOOD, URINE: NEGATIVE
BUN BLDV-MCNC: 13 MG/DL (ref 6–20)
CALCIUM SERPL-MCNC: 9.5 MG/DL (ref 8.6–10.2)
CHLORIDE BLD-SCNC: 102 MMOL/L (ref 98–107)
CLARITY: CLEAR
CO2: 26 MMOL/L (ref 22–29)
COLOR: YELLOW
CREAT SERPL-MCNC: 0.6 MG/DL (ref 0.5–1)
CRYSTALS, UA: ABNORMAL /HPF
EOSINOPHILS ABSOLUTE: 0.17 E9/L (ref 0.05–0.5)
EOSINOPHILS RELATIVE PERCENT: 1.4 % (ref 0–6)
EPITHELIAL CELLS, UA: ABNORMAL /HPF
GFR SERPL CREATININE-BSD FRML MDRD: >60 ML/MIN/1.73
GLUCOSE BLD-MCNC: 93 MG/DL (ref 74–99)
GLUCOSE URINE: NEGATIVE MG/DL
HCG, URINE, POC: NEGATIVE
HCT VFR BLD CALC: 37.7 % (ref 34–48)
HEMOGLOBIN: 11.6 G/DL (ref 11.5–15.5)
IMMATURE GRANULOCYTES #: 0.03 E9/L
IMMATURE GRANULOCYTES %: 0.3 % (ref 0–5)
KETONES, URINE: ABNORMAL MG/DL
LEUKOCYTE ESTERASE, URINE: NEGATIVE
LIPASE: 24 U/L (ref 13–60)
LYMPHOCYTES ABSOLUTE: 2.89 E9/L (ref 1.5–4)
LYMPHOCYTES RELATIVE PERCENT: 24.6 % (ref 20–42)
Lab: NORMAL
MCH RBC QN AUTO: 24.5 PG (ref 26–35)
MCHC RBC AUTO-ENTMCNC: 30.8 % (ref 32–34.5)
MCV RBC AUTO: 79.7 FL (ref 80–99.9)
MONOCYTES ABSOLUTE: 0.76 E9/L (ref 0.1–0.95)
MONOCYTES RELATIVE PERCENT: 6.5 % (ref 2–12)
NEGATIVE QC PASS/FAIL: NORMAL
NEUTROPHILS ABSOLUTE: 7.82 E9/L (ref 1.8–7.3)
NEUTROPHILS RELATIVE PERCENT: 66.7 % (ref 43–80)
NITRITE, URINE: NEGATIVE
PDW BLD-RTO: 17.7 FL (ref 11.5–15)
PH UA: 6.5 (ref 5–9)
PLATELET # BLD: 458 E9/L (ref 130–450)
PMV BLD AUTO: 10.2 FL (ref 7–12)
POSITIVE QC PASS/FAIL: NORMAL
POTASSIUM SERPL-SCNC: 3.6 MMOL/L (ref 3.5–5)
PROTEIN UA: NEGATIVE MG/DL
RBC # BLD: 4.73 E12/L (ref 3.5–5.5)
RBC UA: ABNORMAL /HPF (ref 0–2)
SODIUM BLD-SCNC: 137 MMOL/L (ref 132–146)
SPECIFIC GRAVITY UA: 1.02 (ref 1–1.03)
TOTAL PROTEIN: 6.9 G/DL (ref 6.4–8.3)
UROBILINOGEN, URINE: 0.2 E.U./DL
WBC # BLD: 11.7 E9/L (ref 4.5–11.5)
WBC UA: ABNORMAL /HPF (ref 0–5)

## 2022-11-27 PROCEDURE — 85025 COMPLETE CBC W/AUTO DIFF WBC: CPT

## 2022-11-27 PROCEDURE — 93975 VASCULAR STUDY: CPT

## 2022-11-27 PROCEDURE — 83690 ASSAY OF LIPASE: CPT

## 2022-11-27 PROCEDURE — 81001 URINALYSIS AUTO W/SCOPE: CPT

## 2022-11-27 PROCEDURE — 76830 TRANSVAGINAL US NON-OB: CPT

## 2022-11-27 PROCEDURE — 80053 COMPREHEN METABOLIC PANEL: CPT

## 2022-11-27 PROCEDURE — 99284 EMERGENCY DEPT VISIT MOD MDM: CPT

## 2022-11-27 RX ORDER — TRAMADOL HYDROCHLORIDE 50 MG/1
50 TABLET ORAL EVERY 8 HOURS PRN
Qty: 9 TABLET | Refills: 0 | Status: SHIPPED | OUTPATIENT
Start: 2022-11-27 | End: 2022-11-30

## 2022-11-27 RX ORDER — KETOROLAC TROMETHAMINE 30 MG/ML
15 INJECTION, SOLUTION INTRAMUSCULAR; INTRAVENOUS ONCE
Status: DISCONTINUED | OUTPATIENT
Start: 2022-11-27 | End: 2022-11-27 | Stop reason: HOSPADM

## 2022-11-27 ASSESSMENT — PAIN SCALES - GENERAL: PAINLEVEL_OUTOF10: 7

## 2022-11-27 ASSESSMENT — PAIN - FUNCTIONAL ASSESSMENT
PAIN_FUNCTIONAL_ASSESSMENT: NONE - DENIES PAIN
PAIN_FUNCTIONAL_ASSESSMENT: 0-10

## 2022-11-27 NOTE — ED PROVIDER NOTES
HPI:  22,   Time: 4:22 PM EST Laymon Romberg is a 43 y.o. female presenting to the ED for left lower quadrant pain, beginning 1 week ago. The complaint has been persistent, mild in severity, and worsened by nothing. She is a pleasant 59-year-old female reports left lower quadrant pain. She has a known history of PCOS and she is concerned she may have a cyst.  No sudden change of the pain today. Patient denies any fevers or chills no urinary complaints no dysuria urgency or frequency no vaginal bleeding or vaginal discharge. No flank pain. No nausea vomiting or diarrhea. Patient has been having normal bowel movements. Review of Systems:   Pertinent positives and negatives are stated within HPI, all other systems reviewed and are negative.    --------------------------------------------- PAST HISTORY ---------------------------------------------  Past Medical History:  has a past medical history of Asthma, Hypertension, PCOS (polycystic ovarian syndrome), and Thyroid disease. Past Surgical History:  has a past surgical history that includes  section; LEEP; Abdomen surgery; Cervical Cerclage (2013); and Thyroidectomy, partial.    Social History:  reports that she has never smoked. She has never used smokeless tobacco. She reports current alcohol use. She reports that she does not use drugs. Family History: family history includes Diabetes in her mother; High Blood Pressure in her mother. The patients home medications have been reviewed.     Allergies: Latex and Food    ---------------------------------------------------PHYSICAL EXAM--------------------------------------    Constitutional/General: Alert and oriented x3, well appearing, non toxic in NAD  Head: Normocephalic and atraumatic  Eyes: PERRL, EOMI, conjunctive normal, sclera non icteric  Mouth: Oropharynx clear, handling secretions, no trismus, no asymmetry of the posterior oropharynx or uvular edema  Neck: Supple, full ROM, non tender to palpation in the midline, no stridor, no crepitus, no meningeal signs  Respiratory: Lungs clear to auscultation bilaterally, no wheezes, rales, or rhonchi. Not in respiratory distress  Cardiovascular:  Regular rate. Regular rhythm. No murmurs, gallops, or rubs. 2+ distal pulses  Chest: No chest wall tenderness  GI:  Abdomen Soft, mild tenderness to left lower quadrant. No Nieves sign. No McBurney's point tenderness. No CVA tenderness bilaterally non distended. +BS. No organomegaly, no palpable masses,  No rebound, guarding, or rigidity. Musculoskeletal: Moves all extremities x 4. Warm and well perfused, no clubbing, cyanosis, or edema. Capillary refill <3 seconds  Integument: skin warm and dry. No rashes. Lymphatic: no lymphadenopathy noted  Neurologic: GCS 15, no focal deficits, symmetric strength 5/5 in the upper and lower extremities bilaterally  Psychiatric: Normal Affect    -------------------------------------------------- RESULTS -------------------------------------------------  I have personally reviewed all laboratory and imaging results for this patient. Results are listed below.      LABS:  Results for orders placed or performed during the hospital encounter of 11/27/22   Urinalysis with Microscopic   Result Value Ref Range    Color, UA Yellow Straw/Yellow    Clarity, UA Clear Clear    Glucose, Ur Negative Negative mg/dL    Bilirubin Urine Negative Negative    Ketones, Urine TRACE (A) Negative mg/dL    Specific Gravity, UA 1.020 1.005 - 1.030    Blood, Urine Negative Negative    pH, UA 6.5 5.0 - 9.0    Protein, UA Negative Negative mg/dL    Urobilinogen, Urine 0.2 <2.0 E.U./dL    Nitrite, Urine Negative Negative    Leukocyte Esterase, Urine Negative Negative    WBC, UA NONE 0 - 5 /HPF    RBC, UA NONE 0 - 2 /HPF    Epithelial Cells, UA FEW /HPF    Bacteria, UA RARE (A) None Seen /HPF    Crystals, UA Rare (A) None Seen /HPF   CBC with Auto Differential   Result Value Ref Range    WBC 11.7 (H) 4.5 - 11.5 E9/L    RBC 4.73 3.50 - 5.50 E12/L    Hemoglobin 11.6 11.5 - 15.5 g/dL    Hematocrit 37.7 34.0 - 48.0 %    MCV 79.7 (L) 80.0 - 99.9 fL    MCH 24.5 (L) 26.0 - 35.0 pg    MCHC 30.8 (L) 32.0 - 34.5 %    RDW 17.7 (H) 11.5 - 15.0 fL    Platelets 763 (H) 573 - 450 E9/L    MPV 10.2 7.0 - 12.0 fL    Neutrophils % 66.7 43.0 - 80.0 %    Immature Granulocytes % 0.3 0.0 - 5.0 %    Lymphocytes % 24.6 20.0 - 42.0 %    Monocytes % 6.5 2.0 - 12.0 %    Eosinophils % 1.4 0.0 - 6.0 %    Basophils % 0.5 0.0 - 2.0 %    Neutrophils Absolute 7.82 (H) 1.80 - 7.30 E9/L    Immature Granulocytes # 0.03 E9/L    Lymphocytes Absolute 2.89 1.50 - 4.00 E9/L    Monocytes Absolute 0.76 0.10 - 0.95 E9/L    Eosinophils Absolute 0.17 0.05 - 0.50 E9/L    Basophils Absolute 0.06 0.00 - 0.20 E9/L   Comprehensive Metabolic Panel   Result Value Ref Range    Sodium 137 132 - 146 mmol/L    Potassium 3.6 3.5 - 5.0 mmol/L    Chloride 102 98 - 107 mmol/L    CO2 26 22 - 29 mmol/L    Anion Gap 9 7 - 16 mmol/L    Glucose 93 74 - 99 mg/dL    BUN 13 6 - 20 mg/dL    Creatinine 0.6 0.5 - 1.0 mg/dL    Est, Glom Filt Rate >60 >=60 mL/min/1.73    Calcium 9.5 8.6 - 10.2 mg/dL    Total Protein 6.9 6.4 - 8.3 g/dL    Albumin 4.2 3.5 - 5.2 g/dL    Total Bilirubin <0.2 0.0 - 1.2 mg/dL    Alkaline Phosphatase 82 35 - 104 U/L    ALT 16 0 - 32 U/L    AST 16 0 - 31 U/L   Lipase   Result Value Ref Range    Lipase 24 13 - 60 U/L   POC Pregnancy Urine Qual   Result Value Ref Range    HCG, Urine, POC Negative Negative    Lot Number [id number]     Positive QC Pass/Fail Pass     Negative QC Pass/Fail Pass        RADIOLOGY:  Interpreted by Radiologist.  US DUP ABD PEL RETRO SCROT COMPLETE   Final Result   Unremarkable pelvic ultrasound. 2 prominent uterine leiomyomas. Ovaries appear normal.         US NON OB TRANSVAGINAL   Final Result   Unremarkable pelvic ultrasound. 2 prominent uterine leiomyomas.       Ovaries appear normal. ------------------------- NURSING NOTES AND VITALS REVIEWED ---------------------------   The nursing notes within the ED encounter and vital signs as below have been reviewed by myself. BP (!) 146/96   Pulse 77   Temp 98.8 °F (37.1 °C) (Oral)   Resp 18   Ht 4' 11\" (1.499 m)   Wt 172 lb (78 kg)   LMP 11/11/2022 (Exact Date)   SpO2 99%   BMI 34.74 kg/m²   Oxygen Saturation Interpretation: Normal    The patients available past medical records and past encounters were reviewed. ------------------------------ ED COURSE/MEDICAL DECISION MAKING----------------------  Medications   ketorolac (TORADOL) injection 15 mg (15 mg IntraVENous Not Given 11/27/22 1624)         ED COURSE:  ED Course as of 11/27/22 1820   Sun Nov 27, 2022   1816 Patient reassessed she has no abdominal pain after Toradol. Ultrasound shows normal ovaries normal flow. Does show fibroids. Patient symptoms are fully relieved with Toradol repeat abdominal exam is soft and nontender. I offered her CT imaging of the abdomen she declined she is not had any vomiting or diarrhea she has no tenderness at this time. Urinalysis negative for acute infection. She will follow-up outpatient with her OB/GYN , Dr. Dusty eTran I will give her a short course of pain medication to use as needed and she will return for any new worsening symptoms. [HR]   9340 Patient declined pelvic exam stating she has no pain she has not had any vaginal bleeding or vaginal discharge. [KK]      ED Course User Index  [KK] Mihaela Bautista MD       Medical Decision Making:    Patient is a 49-year-old female with pain to left lower quadrant for the past week. No fevers or chills no urinary complaints no flank pain no vomiting or diarrhea. Patient has a known history of PCOS she is concerned it may be a cyst.  We will check urine lab work. We will give Toradol for pain she has not anything stronger.   We will do ultrasound to evaluate    Differential diagnosis ovarian cyst PCOS UTI pyelonephritis diverticulitis          This patient has remained hemodynamically stable during their ED course. Patient was given Toradol which completely relieved her pain. Urine pregnancy was negative urinalysis was negative for acute infection. CBC was normal slightly elevated white count 11.7 chemistry was normal.Doppler ultrasound showed unremarkable other than 2 prominent uterine fibroids. Ovaries were normal.  Normal flow. Repeat abdominal exam soft nontender. Patient is completely asymptomatic. She was offered CT imaging but declined. She is comfortable plan for outpatient OB/GYN follow-up. She will take Motrin for pain at home she will follow-up with her PCP as well as return for nursing symptoms asymptomatic on discharge. Re-Evaluations:             Re-evaluation. Patients symptoms are improving        Counseling: The emergency provider has spoken with the patient and discussed todays results, in addition to providing specific details for the plan of care and counseling regarding the diagnosis and prognosis. Questions are answered at this time and they are agreeable with the plan.       --------------------------------- IMPRESSION AND DISPOSITION ---------------------------------    IMPRESSION  1. Uterine leiomyoma, unspecified location        DISPOSITION  Disposition: Discharge to home  Patient condition is stable    NOTE: This report was transcribed using voice recognition software.  Every effort was made to ensure accuracy; however, inadvertent computerized transcription errors may be present        Jon Martínez MD  12/01/22 1631

## 2022-12-14 ENCOUNTER — OFFICE VISIT (OUTPATIENT)
Dept: BARIATRICS/WEIGHT MGMT | Age: 42
End: 2022-12-14
Payer: COMMERCIAL

## 2022-12-14 VITALS
BODY MASS INDEX: 34.55 KG/M2 | WEIGHT: 176 LBS | HEIGHT: 60 IN | TEMPERATURE: 97.2 F | SYSTOLIC BLOOD PRESSURE: 121 MMHG | DIASTOLIC BLOOD PRESSURE: 82 MMHG | HEART RATE: 102 BPM

## 2022-12-14 DIAGNOSIS — E66.09 CLASS 1 OBESITY DUE TO EXCESS CALORIES WITHOUT SERIOUS COMORBIDITY WITH BODY MASS INDEX (BMI) OF 34.0 TO 34.9 IN ADULT: ICD-10-CM

## 2022-12-14 DIAGNOSIS — I10 ESSENTIAL HYPERTENSION: Primary | ICD-10-CM

## 2022-12-14 PROCEDURE — 99214 OFFICE O/P EST MOD 30 MIN: CPT | Performed by: INTERNAL MEDICINE

## 2022-12-14 PROCEDURE — G8484 FLU IMMUNIZE NO ADMIN: HCPCS | Performed by: INTERNAL MEDICINE

## 2022-12-14 PROCEDURE — G8428 CUR MEDS NOT DOCUMENT: HCPCS | Performed by: INTERNAL MEDICINE

## 2022-12-14 PROCEDURE — G8417 CALC BMI ABV UP PARAM F/U: HCPCS | Performed by: INTERNAL MEDICINE

## 2022-12-14 PROCEDURE — 1036F TOBACCO NON-USER: CPT | Performed by: INTERNAL MEDICINE

## 2022-12-14 PROCEDURE — 3078F DIAST BP <80 MM HG: CPT | Performed by: INTERNAL MEDICINE

## 2022-12-14 PROCEDURE — 99211 OFF/OP EST MAY X REQ PHY/QHP: CPT

## 2022-12-14 PROCEDURE — 3074F SYST BP LT 130 MM HG: CPT | Performed by: INTERNAL MEDICINE

## 2022-12-14 RX ORDER — PHENTERMINE HYDROCHLORIDE 37.5 MG/1
37.5 TABLET ORAL
Qty: 30 TABLET | Refills: 0 | Status: SHIPPED | OUTPATIENT
Start: 2022-12-14 | End: 2023-01-13

## 2022-12-14 NOTE — PATIENT INSTRUCTIONS
Rules:  Count every calorie every day  Limit sweets to one day per month  Limit chips/crackers/pretzels/nuts/popcorn to 100 sailaja/day  Eliminate all sugar sweetened beverages (including fruit juice)  Limit restaurants (including fast food and food from a convenience store) to one time every two weeks while in town    Requirements:  Make sure protein intake is at least 65 grams per day  Make sure that fiber intake is at least 22 grams per day  Take one multivitamin every day    Targets:  Limit calorie intake to 1200 calories/day  Walk 30 minutes daily  Avoid eating 2 hours within bedtime. Tips:  Do not eat outside of the dining room or the kitchen  Do not eat while watching TV, videos, working on the computer or using a smart phone  Do not eat food out of a multi-serving bag or container. Phentermine:  Take phentermine 37.5 mg, one-half to one tablet daily as needed for appetite suppression. Take each dose 30-90 min before effect will be needed. While taking phentermine, check the Blood Pressure every morning and every evening. If the systolic BP is >198 mmHg, the diastolic BP is >57 mm/Hg or the heart rate is > 100 beats per minute, do not take phentermine that day. If the systolic BP is consistently >155 mmHg, the diastolic BP is consistently above 90 mm/Hg or the heart rate is consistently > 100 beats per minute, then stop taking phentermine altogether. If the systolic BP >839 mmHg or the diastolic BP is >427 mmHg (even if it is only once), then phentermine should be stopped altogether without proving that any of these are consistently elevated. Follow up in 30 days.

## 2023-01-19 ENCOUNTER — OFFICE VISIT (OUTPATIENT)
Dept: BARIATRICS/WEIGHT MGMT | Age: 43
End: 2023-01-19
Payer: COMMERCIAL

## 2023-01-19 VITALS
HEIGHT: 60 IN | DIASTOLIC BLOOD PRESSURE: 86 MMHG | HEART RATE: 88 BPM | WEIGHT: 167.8 LBS | BODY MASS INDEX: 32.94 KG/M2 | TEMPERATURE: 98.1 F | SYSTOLIC BLOOD PRESSURE: 129 MMHG

## 2023-01-19 DIAGNOSIS — I10 ESSENTIAL HYPERTENSION: Primary | ICD-10-CM

## 2023-01-19 DIAGNOSIS — E66.09 CLASS 1 OBESITY DUE TO EXCESS CALORIES WITH SERIOUS COMORBIDITY AND BODY MASS INDEX (BMI) OF 34.0 TO 34.9 IN ADULT: ICD-10-CM

## 2023-01-19 PROCEDURE — 3074F SYST BP LT 130 MM HG: CPT | Performed by: INTERNAL MEDICINE

## 2023-01-19 PROCEDURE — G8417 CALC BMI ABV UP PARAM F/U: HCPCS | Performed by: INTERNAL MEDICINE

## 2023-01-19 PROCEDURE — G8428 CUR MEDS NOT DOCUMENT: HCPCS | Performed by: INTERNAL MEDICINE

## 2023-01-19 PROCEDURE — 99211 OFF/OP EST MAY X REQ PHY/QHP: CPT

## 2023-01-19 PROCEDURE — G8484 FLU IMMUNIZE NO ADMIN: HCPCS | Performed by: INTERNAL MEDICINE

## 2023-01-19 PROCEDURE — 1036F TOBACCO NON-USER: CPT | Performed by: INTERNAL MEDICINE

## 2023-01-19 PROCEDURE — 3079F DIAST BP 80-89 MM HG: CPT | Performed by: INTERNAL MEDICINE

## 2023-01-19 PROCEDURE — 99214 OFFICE O/P EST MOD 30 MIN: CPT | Performed by: INTERNAL MEDICINE

## 2023-01-19 NOTE — PROGRESS NOTES
CC -   Follow up of: HTN, Obesity    BACKGROUND -   Last visit: 22  First visit: 10/12/22    Obesity (all weight in lbs)  Began 4-5  Initial Ht 59.5, Wt 184.2,  BMI 36.58  HS Grad wt 120   Lowest   wt 120   Highest  wt 187 (7-8 yrs ago)  Pattern of wt gain: gradual  Wt change past yr: +7-8 lbs  Most wt lost: 15 lbs   Other diets attempted: 88 Vidya [place], karen    Desire to lose weight: 10/10 (no surgery)    Initial Diet:    Number of meals per day - 3    Number of snacks per day - 2    Meal volume - 12\" plate,  rarely seconds    Fast food/convenience store - 7x/week    Restaurants (not fast food) - 0-1x/week   Sweets - 5d/week   Chips - 0d/week   Crackers/pretzels - 2d/week (pretzels)   Nuts - 0-1d/week   Peanut Butter - 1-2d/week   Popcorn - 0-1d/week   Dried fruit - 0d/week   Whole fruit - 7d/week (2-3 servings/day)   Breakfast cereal - 0d/week   Granola/Protein/Energy bar - 0d/week   Sugar sweetened beverages - DD refresher 2-3x/wk (130 Micheal), no pop/soda, orange juice ~4 oz 2-3x/wk, no coffee, no energy drink   Protein - Naked pea protein powder 1 scoop 5x/d in orange juice+water+fruit+kale   Fiber - No supplements    Wt effect of HR foods = 2100 Micheal/wk = 300 Micheal/d= 17% DEN = 31 lb/year. Initial Exercise:    Gym membership - [facility] - 1x/wk for wateraerobics Sat 1 hr    Walking - 3x/wk - 1.5-2 miles    Running - no    Resistance - no    Aerobic class - no    ______________________    [facility] -  Past Medical History:   Diagnosis Date    Asthma     Hypertension     PCOS (polycystic ovarian syndrome)     Thyroid disease      Past Surgical History:   Procedure Laterality Date    ABDOMEN SURGERY      csection    CERVICAL CERCLAGE  2013     SECTION      LEEP      THYROIDECTOMY, PARTIAL       Prior to Admission medications    Medication Sig Start Date End Date Taking?  Authorizing Provider   amLODIPine (NORVASC) 5 MG tablet take 1 tablet by mouth once daily 10/12/22   Historical Provider, MD   FEROSUL 797 (21 Fe) MG tablet take 1 tablet by mouth daily 11/9/22   Historical Provider, MD   omega-3 acid ethyl esters (LOVAZA) 1 g capsule take 2 capsules by mouth twice a day 11/10/22   Historical Provider, MD   lisinopril (PRINIVIL;ZESTRIL) 20 MG tablet take 1 tablet by mouth once daily 9/28/22   Historical Provider, MD   Cholecalciferol (VITAMIN D3) 50 MCG (2000 UT) CAPS take 1 capsule by mouth once daily 7/5/22   Historical Provider, MD   fluticasone (FLOVENT HFA) 220 MCG/ACT inhaler Inhale 1 puff into the lungs 2 times daily    Historical Provider, MD   Cetirizine HCl 10 MG CAPS Take by mouth daily    Historical Provider, MD   montelukast (SINGULAIR) 10 MG tablet Take 10 mg by mouth nightly    Historical Provider, MD   albuterol sulfate HFA (PROVENTIL HFA) 108 (90 Base) MCG/ACT inhaler Inhale 2 puffs into the lungs every 4 hours as needed for Wheezing 11/26/17 4/9/21  EUGENIO Batista   metFORMIN (GLUCOPHAGE) 500 MG tablet Take 500 mg by mouth 2 times daily (with meals)    Historical Provider, MD   Norvasc 5 mg daily. Fish oil 4 daily, and iron tablet once a day. Metformin for PCOS    Allergies: Allergies   Allergen Reactions    Latex Itching    Food      fish   Tolerates fish oil. Social history: smoking: no ; Alcohol: occasional wine , work: [employer].    ROS -  Card - no CP  GI - no N/V/D/C    PE -  Gen : /86 (Site: Left Upper Arm, Position: Sitting, Cuff Size: Large Adult)   Pulse 88   Temp 98.1 °F (36.7 °C)   Ht 4' 11.5\" (1.511 m)   Wt 167 lb 12.8 oz (76.1 kg)   LMP 01/06/2023 (Exact Date)   BMI 33.32 kg/m²    WN, WD, NAD  Lung: Nml resp effort, CTA B/L  Heart:  RRR w/o MGR, no LE pitting edema b/l  Psych: Normal mood   Full affect  Neuro: Moves all ext well  ______________________    HISTORY & ASSESSMENT/PLAN -     Problem 1 - Hypertension   HPI   - controlled on current medications(Lisinopril, Amlodipine), patient asymptomatic. Assessment  - controlled  Plan   - Continue medications.  Weight reduction can help de-escalate doses. Weight reduction per plan below    Problem 2  - Obesity   HPI   - See above Background for description    Weight  Date    184.2  10/12/22 Phentermine #1    176.0  22 Phentermine #2    176.0  22 Phentermine #3    167.8  23    Total weight change to date: -16.4 lbs. Average daily energy variance:  10/16/2022 - 2022: -6.6 lbs (59415 Micheal)/30 d = -770 Micheal/d deficit. 2022 - 2022: +0.0 lbs (0 Micheal)/28 d = +0 Micheal/d excess. 2022 - 2023: -8.2 lbs (56737 Micheal)/36 d = -797 Micheal/d deficit. Patient's estimated daily energy need (DEN) = 1776 Micheal/d = 40789 Micheal/wk    Update:  Calorie monitorind/wk, usual intake  Micheal/d  Sweets: 1-2d/month (not in the last 21 days fasting till coming )  SSB: none  Restaurant food: 0x/month  Appetite suppressant: phentermine 37.5 mg, was taking a whole tablet daily, appetite suppression: 8-9/10; side effects: some dry mouth  Home BP monitoring: bid, smt diastolic in 32D  Protein: protein shake, chicken, turkey breast - currently beyond burger/vegan options and eggs  Fiber: fiber capsule, quinoa,  +apple+kale etc  Water: 80-90 oz/d  Activities: 3x/wk at gym, at home 6x/wk (~1 hr at gym 30 min at home)    Assessment  - improving    Plan   - she is doing very well with current plan and would like to continue. Phentermine did help, and she feels she needs another appetite suppressant. Would like Contrave - had it in the past and tolerated.  Planned as follows  Patient Instructions   Rules:  Count every calorie every day  Limit sweets to one day per month  Limit chips/crackers/pretzels/nuts/popcorn to 100 micheal/day  Eliminate all sugar sweetened beverages (including fruit juice)  Limit restaurants (including fast food and food from a convenience store) to one time every two weeks while in town    Requirements:  Make sure protein intake is at least 65 grams per day  Make sure that fiber intake is at least 22 grams per day  Take one multivitamin every day    Targets:  Limit calorie intake to 1200 calories/day  Walk 30 minutes daily  Avoid eating 2 hours within bedtime. Tips:  Do not eat outside of the dining room or the kitchen  Do not eat while watching TV, videos, working on the computer or using a smart phone  Do not eat food out of a multi-serving bag or container. Contrave:  Begin taking Contrave 8/90 mg according to the following schedule:   Week 1 - Take one tablet every morning with breakfast   Week 2 - Take one tablet every morning with breakfast and one tablet every evening with dinner   Week 3 - Take two tablets every morning with breakfast and one tablet every evening with dinner   Week 4 - Take two tablets every morning with breakfast and two tablets every evening with dinner    While taking Contrave, check the Blood Pressure every morning and every evening for four days after each dose change. If the systolic BP is consistently >155 mmHg or the diastolic BP is consistently >90 mm/Hg or if the heart rate is consistently > 100 beats per minute, then stop taking Contrave (contact me for instructions on how to do this because it will have to be tapered off:  [phone]). If the systolic BP >457 mmHg or the diastolic BP is >409 mmHg (even if it is only once), then Contrave should be stopped without proving that it is consistently elevated (contact me for instructions). Follow up in 6 weeks. Medication management: Sim Benitez MD  Internal Medicine/Obesity Medicine  1/19/2023.

## 2023-01-19 NOTE — PATIENT INSTRUCTIONS
Rules:  Count every calorie every day  Limit sweets to one day per month  Limit chips/crackers/pretzels/nuts/popcorn to 100 sailaja/day  Eliminate all sugar sweetened beverages (including fruit juice)  Limit restaurants (including fast food and food from a convenience store) to one time every two weeks while in town    Requirements:  Make sure protein intake is at least 65 grams per day  Make sure that fiber intake is at least 22 grams per day  Take one multivitamin every day    Targets:  Limit calorie intake to 1200 calories/day  Walk 30 minutes daily  Avoid eating 2 hours within bedtime. Tips:  Do not eat outside of the dining room or the kitchen  Do not eat while watching TV, videos, working on the computer or using a smart phone  Do not eat food out of a multi-serving bag or container. Contrave:  Begin taking Contrave 8/90 mg according to the following schedule:   Week 1 - Take one tablet every morning with breakfast   Week 2 - Take one tablet every morning with breakfast and one tablet every evening with dinner   Week 3 - Take two tablets every morning with breakfast and one tablet every evening with dinner   Week 4 - Take two tablets every morning with breakfast and two tablets every evening with dinner    While taking Contrave, check the Blood Pressure every morning and every evening for four days after each dose change. If the systolic BP is consistently >155 mmHg or the diastolic BP is consistently >90 mm/Hg or if the heart rate is consistently > 100 beats per minute, then stop taking Contrave (contact me for instructions on how to do this because it will have to be tapered off:  [phone]). If the systolic BP >506 mmHg or the diastolic BP is >771 mmHg (even if it is only once), then Contrave should be stopped without proving that it is consistently elevated (contact me for instructions). Follow up in 6 weeks.

## 2023-03-06 ENCOUNTER — OFFICE VISIT (OUTPATIENT)
Dept: BARIATRICS/WEIGHT MGMT | Age: 43
End: 2023-03-06
Payer: COMMERCIAL

## 2023-03-06 VITALS
WEIGHT: 168 LBS | HEIGHT: 60 IN | DIASTOLIC BLOOD PRESSURE: 86 MMHG | BODY MASS INDEX: 32.98 KG/M2 | HEART RATE: 82 BPM | SYSTOLIC BLOOD PRESSURE: 135 MMHG

## 2023-03-06 DIAGNOSIS — E66.09 CLASS 1 OBESITY DUE TO EXCESS CALORIES WITH SERIOUS COMORBIDITY AND BODY MASS INDEX (BMI) OF 33.0 TO 33.9 IN ADULT: ICD-10-CM

## 2023-03-06 DIAGNOSIS — I10 ESSENTIAL HYPERTENSION: Primary | ICD-10-CM

## 2023-03-06 PROCEDURE — G8417 CALC BMI ABV UP PARAM F/U: HCPCS | Performed by: INTERNAL MEDICINE

## 2023-03-06 PROCEDURE — 99211 OFF/OP EST MAY X REQ PHY/QHP: CPT

## 2023-03-06 PROCEDURE — 3075F SYST BP GE 130 - 139MM HG: CPT | Performed by: INTERNAL MEDICINE

## 2023-03-06 PROCEDURE — 3079F DIAST BP 80-89 MM HG: CPT | Performed by: INTERNAL MEDICINE

## 2023-03-06 PROCEDURE — 1036F TOBACCO NON-USER: CPT | Performed by: INTERNAL MEDICINE

## 2023-03-06 PROCEDURE — G8428 CUR MEDS NOT DOCUMENT: HCPCS | Performed by: INTERNAL MEDICINE

## 2023-03-06 PROCEDURE — G8484 FLU IMMUNIZE NO ADMIN: HCPCS | Performed by: INTERNAL MEDICINE

## 2023-03-06 PROCEDURE — 99214 OFFICE O/P EST MOD 30 MIN: CPT | Performed by: INTERNAL MEDICINE

## 2023-03-06 RX ORDER — PHENTERMINE AND TOPIRAMATE 7.5; 46 MG/1; MG/1
1 CAPSULE, EXTENDED RELEASE ORAL DAILY
Qty: 30 CAPSULE | Refills: 1 | Status: SHIPPED | OUTPATIENT
Start: 2023-03-21 | End: 2023-04-20

## 2023-03-06 RX ORDER — PHENTERMINE AND TOPIRAMATE 3.75; 23 MG/1; MG/1
1 CAPSULE, EXTENDED RELEASE ORAL DAILY
Qty: 14 CAPSULE | Refills: 0 | Status: SHIPPED | OUTPATIENT
Start: 2023-03-06 | End: 2023-03-20

## 2023-03-06 NOTE — PROGRESS NOTES
CC -   Follow up of: HTN, Obesity    BACKGROUND -   Last visit: 23  First visit: 10/12/22    Obesity (all weight in lbs)  Began 4-5  Initial Ht 59.5, Wt 184.2,  BMI 36.58  HS Grad wt 120   Lowest   wt 120   Highest  wt 187 (7-8 yrs ago)  Pattern of wt gain: gradual  Wt change past yr: +7-8 lbs  Most wt lost: 15 lbs   Other diets attempted: [employer], exercise    Desire to lose weight: 10/10 (no surgery)    Initial Diet:    Number of meals per day - 3    Number of snacks per day - 2    Meal volume - 12\" plate,  rarely seconds    Fast food/convenience store - 7x/week    Restaurants (not fast food) - 0-1x/week   Sweets - 5d/week   Chips - 0d/week   Crackers/pretzels - 2d/week (pretzels)   Nuts - 0-1d/week   Peanut Butter - 1-2d/week   Popcorn - 0-1d/week   Dried fruit - 0d/week   Whole fruit - 7d/week (2-3 servings/day)   Breakfast cereal - 0d/week   Granola/Protein/Energy bar - 0d/week   Sugar sweetened beverages - DD refresher 2-3x/wk (130 Micheal), no pop/soda, orange juice ~4 oz 2-3x/wk, no coffee, no energy drink   Protein - Naked pea protein powder 1 scoop 5x/d in orange juice+water+fruit+kale   Fiber - No supplements    Wt effect of HR foods = 2100 Micheal/wk = 300 Micheal/d= 17% DEN = 31 lb/year. Initial Exercise:    Gym membership - [facility] - 1x/wk for wateraerobics Sat 1 hr    Walking - 3x/wk - 1.5-2 miles    Running - no    Resistance - no    Aerobic class - no    ______________________     [facility] -  Past Medical History:   Diagnosis Date    Asthma     Hypertension     PCOS (polycystic ovarian syndrome)     Thyroid disease      Past Surgical History:   Procedure Laterality Date    ABDOMEN SURGERY      csection    CERVICAL CERCLAGE  2013     SECTION      LEEP      THYROIDECTOMY, PARTIAL       Prior to Admission medications    Medication Sig Start Date End Date Taking?  Authorizing Provider   naltrexone-buPROPion (CONTRAVE) 8-90 MG per extended release tablet Begin taking Contrave 8/90 mg according to the following schedule: Week 1: Take 1 tablet every morning with breakfast. Week 2: One tablet with breakfast and 1 tablet every evening with dinner. Week 3:Two tab with breakfast and 1 tab with dinner. Week 4: Two tab with breakfast and 2 tab with dinner. 1/19/23   Flako Shi MD   amLODIPine (NORVASC) 5 MG tablet take 1 tablet by mouth once daily 10/12/22   Historical Provider, MD   FEROSUL 325 (65 Fe) MG tablet take 1 tablet by mouth daily 11/9/22   Historical Provider, MD   omega-3 acid ethyl esters (LOVAZA) 1 g capsule take 2 capsules by mouth twice a day 11/10/22   Historical Provider, MD   lisinopril (PRINIVIL;ZESTRIL) 20 MG tablet take 1 tablet by mouth once daily 9/28/22   Historical Provider, MD   Cholecalciferol (VITAMIN D3) 50 MCG (2000 UT) CAPS take 1 capsule by mouth once daily 7/5/22   Historical Provider, MD   fluticasone (FLOVENT HFA) 220 MCG/ACT inhaler Inhale 1 puff into the lungs 2 times daily    Historical Provider, MD   Cetirizine HCl 10 MG CAPS Take by mouth daily    Historical Provider, MD   montelukast (SINGULAIR) 10 MG tablet Take 10 mg by mouth nightly    Historical Provider, MD   albuterol sulfate HFA (PROVENTIL HFA) 108 (90 Base) MCG/ACT inhaler Inhale 2 puffs into the lungs every 4 hours as needed for Wheezing 11/26/17 4/9/21  [facility]   metFORMIN (GLUCOPHAGE) 500 MG tablet Take 500 mg by mouth 2 times daily (with meals)    Historical Provider, MD Crabtreevasc 5 mg daily. Fish oil 4 daily, and iron tablet once a day. Metformin for PCOS.    3/6/23 taking MVI daily. Allergies: Allergies   Allergen Reactions    Latex Itching    Food      fish   Tolerates fish oil.     Social history: smoking: no ; Alcohol: occasional wine , work: .    ROS -  Card - no CP  GI - no N/V/D/C    PE -  Gen : /86 (Site: Left Upper Arm, Position: Sitting, Cuff Size: Large Adult)   Pulse 82   Ht 4' 11.5\" (1.511 m)   Wt 168 lb (76.2 kg)   LMP 03/05/2023 (Exact Date)   BMI 33.36 kg/m²    WN, WD, NAD  Lung: Nml resp effort, CTA B/L  Heart:  RRR w/o MGR, no LE pitting edema b/l  Psych: Normal mood   Full affect  Neuro: Moves all ext well  ______________________    HISTORY & ASSESSMENT/PLAN -     Problem 1 - Hypertension   HPI   - controlled on current medications(Lisinopril, Amlodipine), patient asymptomatic. Assessment  - controlled  Plan   - Continue medications. Weight reduction can help de-escalate doses. Weight reduction per plan below    Problem 2  - Obesity   HPI   - See above Background for description    Weight  Date    184.2  10/12/22 Phentermine #1    176.0  22 Phentermine #2    176.0  22 Phentermine #3    167.8  23   Contrave - did not work - taper    168.0  3/6/23      Total weight change to date: -16.4 lbs. Average daily energy variance:  10/16/2022 - 2022: -6.6 lbs (37571 Micheal)/30 d = -770 Micheal/d deficit. 2022 - 2022: +0.0 lbs (0 Micheal)/28 d = +0 Micheal/d excess. 2022 - 2023: -8.2 lbs (30348 Micheal)/36 d = -797 Micheal/d deficit. Patient's estimated daily energy need (DEN) = 1776 Micheal/d = 25596 Micheal/wk    Update:  Calorie monitorind/wk, usual intake 1200 Micheal/d  Sweets: 2d/month  SSB: none  Restaurant food: 0x/month  Appetite suppressant: Started Contrave about 4 weeks ago, but does not seem to be helping with appetite, no s/e. Home BP monitoring: bid, smt diastolic in 41U  Protein: protein shake, chicken, turkey breast - currently beyond burger/vegan options and eggs  Fiber: fiber capsule, quinoa,  +apple+kale etc  Water: 80-90 oz/d  Activities: 3x/wk at gym, at home 6x/wk (~1 hr at gym 30 min at home)    Assessment  - uncontrolled    Plan   - she seems to be having difficulty and finding Contrave not effective on appetite suppression. So we planned to stop it. After going over other options, we planned to start Qsymia.  Planned as follows  Patient Instructions   Rules:  Count every calorie every day  Limit sweets to one day per month  Limit chips/crackers/pretzels/nuts/popcorn to 100 sailaja/day  Eliminate all sugar sweetened beverages (including fruit juice)  Limit restaurants (including fast food and food from a convenience store) to one time every two weeks while in town    Requirements:  Make sure protein intake is at least 65 grams per day  Make sure that fiber intake is at least 22 grams per day  Take one multivitamin every day    Targets:  Limit calorie intake to 1200 calories/day  Walk 30 minutes daily  Avoid eating 2 hours within bedtime. Tips:  Do not eat outside of the dining room or the kitchen  Do not eat while watching TV, videos, working on the computer or using a smart phone  Do not eat food out of a multi-serving bag or container. Contrave:  Taper Contrave by taking 2 in the morning and 1 in the evening for 3 days, then take 1 in the morning and 1 in the evening for 3 days, then take 1 in the morning for 3 days, then stop. Qsymia:  Take Qsymia 3.75/23 mg, one tablet daily for two weeks; then increase the dose to 7.5/46 mg, one tablet daily from then on    While taking Qsymia, check the Blood Pressure every morning and every evening. If the systolic BP is >548 mmHg, the diastolic BP is >03 mm/Hg or the heart rate is > 100 beats per minute, do not take Qsymia that day. If the systolic BP is consistently >155 mmHg, the diastolic BP is consistently above 90 mm/Hg or the heart rate is consistently > 100 beats per minute, then stop taking Qsymia altogether (contact me for weaning instructions)    If the systolic BP >260 mmHg or the diastolic BP is >274 mmHg (even if it is only once), then Qsymia should be stopped altogether without proving that any of these are consistently elevated. Follow up in 30 days. Medication management: Dayana Topete MD  Internal Medicine/Obesity Medicine  3/6/2023.

## 2023-03-06 NOTE — PATIENT INSTRUCTIONS
Rules:  Count every calorie every day  Limit sweets to one day per month  Limit chips/crackers/pretzels/nuts/popcorn to 100 sailaja/day  Eliminate all sugar sweetened beverages (including fruit juice)  Limit restaurants (including fast food and food from a convenience store) to one time every two weeks while in town    Requirements:  Make sure protein intake is at least 65 grams per day  Make sure that fiber intake is at least 22 grams per day  Take one multivitamin every day    Targets:  Limit calorie intake to 1200 calories/day  Walk 30 minutes daily  Avoid eating 2 hours within bedtime. Tips:  Do not eat outside of the dining room or the kitchen  Do not eat while watching TV, videos, working on the computer or using a smart phone  Do not eat food out of a multi-serving bag or container. Contrave:  Taper Contrave by taking 2 in the morning and 1 in the evening for 3 days, then take 1 in the morning and 1 in the evening for 3 days, then take 1 in the morning for 3 days, then stop. Qsymia:  Take Qsymia 3.75/23 mg, one tablet daily for two weeks; then increase the dose to 7.5/46 mg, one tablet daily from then on    While taking Qsymia, check the Blood Pressure every morning and every evening. If the systolic BP is >073 mmHg, the diastolic BP is >07 mm/Hg or the heart rate is > 100 beats per minute, do not take Qsymia that day. If the systolic BP is consistently >155 mmHg, the diastolic BP is consistently above 90 mm/Hg or the heart rate is consistently > 100 beats per minute, then stop taking Qsymia altogether (contact me for weaning instructions)    If the systolic BP >332 mmHg or the diastolic BP is >278 mmHg (even if it is only once), then Qsymia should be stopped altogether without proving that any of these are consistently elevated. Follow up in 30 days.

## 2023-04-17 ENCOUNTER — OFFICE VISIT (OUTPATIENT)
Dept: BARIATRICS/WEIGHT MGMT | Age: 43
End: 2023-04-17
Payer: COMMERCIAL

## 2023-04-17 VITALS
BODY MASS INDEX: 32.67 KG/M2 | HEIGHT: 60 IN | WEIGHT: 166.4 LBS | SYSTOLIC BLOOD PRESSURE: 110 MMHG | DIASTOLIC BLOOD PRESSURE: 75 MMHG | HEART RATE: 78 BPM

## 2023-04-17 DIAGNOSIS — E66.09 CLASS 1 OBESITY DUE TO EXCESS CALORIES WITH SERIOUS COMORBIDITY AND BODY MASS INDEX (BMI) OF 33.0 TO 33.9 IN ADULT: ICD-10-CM

## 2023-04-17 DIAGNOSIS — I10 ESSENTIAL HYPERTENSION: Primary | ICD-10-CM

## 2023-04-17 PROCEDURE — 3078F DIAST BP <80 MM HG: CPT | Performed by: INTERNAL MEDICINE

## 2023-04-17 PROCEDURE — 99214 OFFICE O/P EST MOD 30 MIN: CPT | Performed by: INTERNAL MEDICINE

## 2023-04-17 PROCEDURE — 3074F SYST BP LT 130 MM HG: CPT | Performed by: INTERNAL MEDICINE

## 2023-04-17 PROCEDURE — G8417 CALC BMI ABV UP PARAM F/U: HCPCS | Performed by: INTERNAL MEDICINE

## 2023-04-17 PROCEDURE — 1036F TOBACCO NON-USER: CPT | Performed by: INTERNAL MEDICINE

## 2023-04-17 PROCEDURE — 99211 OFF/OP EST MAY X REQ PHY/QHP: CPT

## 2023-04-17 PROCEDURE — G8428 CUR MEDS NOT DOCUMENT: HCPCS | Performed by: INTERNAL MEDICINE

## 2023-04-17 RX ORDER — PHENTERMINE HYDROCHLORIDE 37.5 MG/1
37.5 TABLET ORAL
Qty: 30 TABLET | Refills: 0 | Status: SHIPPED | OUTPATIENT
Start: 2023-04-17 | End: 2023-05-17

## 2023-04-17 NOTE — PATIENT INSTRUCTIONS
Rules:  Count every calorie every day  Limit sweets to one day per month  Limit chips/crackers/pretzels/nuts/popcorn to 100 sailaja/day  Eliminate all sugar sweetened beverages (including fruit juice)  Limit restaurants (including fast food and food from a convenience store) to one time every two weeks while in town    Requirements:  Make sure protein intake is at least 65 grams per day  Make sure that fiber intake is at least 22 grams per day  Take one multivitamin every day    Targets:  Limit calorie intake to 1200 calories/day  Walk 30 minutes daily  Avoid eating 2 hours within bedtime. Tips:  Do not eat outside of the dining room or the kitchen  Do not eat while watching TV, videos, working on the computer or using a smart phone  Do not eat food out of a multi-serving bag or container. Phentermine:  Take phentermine 37.5 mg, one-half to one tablet daily as needed for appetite suppression. Take each dose 30-90 min before effect will be needed. While taking phentermine, check the Blood Pressure every morning and every evening. If the systolic BP is >507 mmHg, the diastolic BP is >88 mm/Hg or the heart rate is > 100 beats per minute, do not take phentermine that day. If the systolic BP is consistently >155 mmHg, the diastolic BP is consistently above 90 mm/Hg or the heart rate is consistently > 100 beats per minute, then stop taking phentermine altogether. If the systolic BP >830 mmHg or the diastolic BP is >610 mmHg (even if it is only once), then phentermine should be stopped altogether without proving that any of these are consistently elevated. Follow up in 30 days.

## 2023-05-16 ENCOUNTER — OFFICE VISIT (OUTPATIENT)
Dept: BARIATRICS/WEIGHT MGMT | Age: 43
End: 2023-05-16
Payer: COMMERCIAL

## 2023-05-16 VITALS
TEMPERATURE: 96.6 F | BODY MASS INDEX: 32.71 KG/M2 | WEIGHT: 166.6 LBS | DIASTOLIC BLOOD PRESSURE: 80 MMHG | HEART RATE: 81 BPM | HEIGHT: 60 IN | SYSTOLIC BLOOD PRESSURE: 128 MMHG

## 2023-05-16 DIAGNOSIS — I10 ESSENTIAL HYPERTENSION: Primary | ICD-10-CM

## 2023-05-16 DIAGNOSIS — E66.09 CLASS 1 OBESITY DUE TO EXCESS CALORIES WITH SERIOUS COMORBIDITY AND BODY MASS INDEX (BMI) OF 33.0 TO 33.9 IN ADULT: ICD-10-CM

## 2023-05-16 PROCEDURE — 99211 OFF/OP EST MAY X REQ PHY/QHP: CPT

## 2023-05-16 PROCEDURE — G8417 CALC BMI ABV UP PARAM F/U: HCPCS | Performed by: INTERNAL MEDICINE

## 2023-05-16 PROCEDURE — 3074F SYST BP LT 130 MM HG: CPT | Performed by: INTERNAL MEDICINE

## 2023-05-16 PROCEDURE — 3079F DIAST BP 80-89 MM HG: CPT | Performed by: INTERNAL MEDICINE

## 2023-05-16 PROCEDURE — 1036F TOBACCO NON-USER: CPT | Performed by: INTERNAL MEDICINE

## 2023-05-16 PROCEDURE — G8428 CUR MEDS NOT DOCUMENT: HCPCS | Performed by: INTERNAL MEDICINE

## 2023-05-16 PROCEDURE — 99214 OFFICE O/P EST MOD 30 MIN: CPT | Performed by: INTERNAL MEDICINE

## 2023-05-16 RX ORDER — PHENTERMINE HYDROCHLORIDE 37.5 MG/1
37.5 TABLET ORAL
Qty: 30 TABLET | Refills: 0 | Status: SHIPPED | OUTPATIENT
Start: 2023-05-16 | End: 2023-06-15

## 2023-05-16 NOTE — PATIENT INSTRUCTIONS
Rules:  Count every calorie every day  Limit sweets to one day per month  Limit chips/crackers/pretzels/nuts/popcorn to 100 sailaja/day  Eliminate all sugar sweetened beverages (including fruit juice)  Limit restaurants (including fast food and food from a convenience store) to one time every two weeks while in town    Requirements:  Make sure protein intake is at least 65 grams per day  Make sure that fiber intake is at least 22 grams per day  Take one multivitamin every day    Targets:  Limit calorie intake to 1200 calories/day  Walk 30 minutes daily  Avoid eating 2 hours within bedtime. Tips:  Do not eat outside of the dining room or the kitchen  Do not eat while watching TV, videos, working on the computer or using a smart phone  Do not eat food out of a multi-serving bag or container. Phentermine:  Take phentermine 37.5 mg, one-half to one tablet daily as needed for appetite suppression. Take each dose 30-90 min before effect will be needed. While taking phentermine, check the Blood Pressure every morning and every evening. If the systolic BP is >225 mmHg, the diastolic BP is >84 mm/Hg or the heart rate is > 100 beats per minute, do not take phentermine that day. If the systolic BP is consistently >155 mmHg, the diastolic BP is consistently above 90 mm/Hg or the heart rate is consistently > 100 beats per minute, then stop taking phentermine altogether. If the systolic BP >458 mmHg or the diastolic BP is >948 mmHg (even if it is only once), then phentermine should be stopped altogether without proving that any of these are consistently elevated. Follow up in 30 days.

## 2023-05-16 NOTE — PROGRESS NOTES
altogether. If the systolic BP >431 mmHg or the diastolic BP is >960 mmHg (even if it is only once), then phentermine should be stopped altogether without proving that any of these are consistently elevated. Follow up in 30 days. Medication management: Phentermine    Guillermo Daley MD  Internal Medicine/Obesity Medicine  5/16/2023.

## 2023-06-19 ENCOUNTER — OFFICE VISIT (OUTPATIENT)
Dept: BARIATRICS/WEIGHT MGMT | Age: 43
End: 2023-06-19
Payer: COMMERCIAL

## 2023-06-19 VITALS
TEMPERATURE: 97.6 F | SYSTOLIC BLOOD PRESSURE: 122 MMHG | WEIGHT: 162 LBS | BODY MASS INDEX: 31.8 KG/M2 | HEART RATE: 89 BPM | DIASTOLIC BLOOD PRESSURE: 86 MMHG | HEIGHT: 60 IN

## 2023-06-19 DIAGNOSIS — E66.09 CLASS 1 OBESITY DUE TO EXCESS CALORIES WITH SERIOUS COMORBIDITY AND BODY MASS INDEX (BMI) OF 32.0 TO 32.9 IN ADULT: ICD-10-CM

## 2023-06-19 DIAGNOSIS — I10 ESSENTIAL HYPERTENSION: Primary | ICD-10-CM

## 2023-06-19 PROCEDURE — 1036F TOBACCO NON-USER: CPT | Performed by: INTERNAL MEDICINE

## 2023-06-19 PROCEDURE — 99211 OFF/OP EST MAY X REQ PHY/QHP: CPT

## 2023-06-19 PROCEDURE — G8417 CALC BMI ABV UP PARAM F/U: HCPCS | Performed by: INTERNAL MEDICINE

## 2023-06-19 PROCEDURE — 99214 OFFICE O/P EST MOD 30 MIN: CPT | Performed by: INTERNAL MEDICINE

## 2023-06-19 PROCEDURE — 3079F DIAST BP 80-89 MM HG: CPT | Performed by: INTERNAL MEDICINE

## 2023-06-19 PROCEDURE — 3074F SYST BP LT 130 MM HG: CPT | Performed by: INTERNAL MEDICINE

## 2023-06-19 PROCEDURE — G8428 CUR MEDS NOT DOCUMENT: HCPCS | Performed by: INTERNAL MEDICINE

## 2023-06-19 RX ORDER — PHENTERMINE HYDROCHLORIDE 37.5 MG/1
37.5 TABLET ORAL
Qty: 30 TABLET | Refills: 0 | Status: SHIPPED | OUTPATIENT
Start: 2023-06-19 | End: 2023-07-19

## 2023-06-19 NOTE — PROGRESS NOTES
CC -   Follow up of: HTN, Obesity    BACKGROUND -   Last visit: 23  First visit: 10/12/22    Obesity (all weight in lbs)  Began 4-5  Initial Ht 59.5, Wt 184.2,  BMI 36.58  HS Grad wt 120   Lowest   wt 120   Highest  wt 187 (7-8 yrs ago)  Pattern of wt gain: gradual  Wt change past yr: +7-8 lbs  Most wt lost: 15 lbs   Other diets attempted: [employer], exercise    Desire to lose weight: 10/10 (no surgery)    Initial Diet:    Number of meals per day - 3    Number of snacks per day - 2    Meal volume - 12\" plate,  rarely seconds    Fast food/convenience store - 7x/week    Restaurants (not fast food) - 0-1x/week   Sweets - 5d/week   Chips - 0d/week   Crackers/pretzels - 2d/week (pretzels)   Nuts - 0-1d/week   Peanut Butter - 1-2d/week   Popcorn - 0-1d/week   Dried fruit - 0d/week   Whole fruit - 7d/week (2-3 servings/day)   Breakfast cereal - 0d/week   Granola/Protein/Energy bar - 0d/week   Sugar sweetened beverages - DD refresher 2-3x/wk ([address]), no pop/soda, orange juice ~4 oz 2-3x/wk, no coffee, no energy drink   Protein - Naked pea protein powder 1 scoop 5x/d in orange juice+water+fruit+kale   Fiber - No supplements     Initial Exercise:    Gym membership - [facility] - 1x/wk for wateraerobics Sat 1 hr    Walking - 3x/wk - 1.5-2 miles    Running - no    Resistance - no    Aerobic class - no    ______________________    [facility] [place] [facility] -  Past Medical History:   Diagnosis Date    Asthma     Hypertension     PCOS (polycystic ovarian syndrome)     Thyroid disease      Past Surgical History:   Procedure Laterality Date    ABDOMEN SURGERY      csection    CERVICAL CERCLAGE  2013     SECTION      LEEP      THYROIDECTOMY, PARTIAL       Prior to Admission medications    Medication Sig Start Date End Date Taking?  Authorizing Provider   amLODIPine (NORVASC) 5 MG tablet take 1 tablet by mouth once daily 10/12/22   Historical Provider, MD   FEROSUL 325 (65 Fe) MG tablet take 1 tablet by mouth daily 22   Historical Provider, MD

## 2023-06-19 NOTE — PATIENT INSTRUCTIONS
Rules:  Count every calorie every day  Limit sweets to one day per month  Limit chips/crackers/pretzels/nuts/popcorn to 100 sailaja/day  Eliminate all sugar sweetened beverages (including fruit juice)  Limit restaurants (including fast food and food from a convenience store) to one time every two weeks while in town    Requirements:  Make sure protein intake is at least 65 grams per day  Make sure that fiber intake is at least 22 grams per day  Take one multivitamin every day    Targets:  Limit calorie intake to 1200 calories/day  Walk 30 minutes daily  Avoid eating 2 hours within bedtime. Tips:  Do not eat outside of the dining room or the kitchen  Do not eat while watching TV, videos, working on the computer or using a smart phone  Do not eat food out of a multi-serving bag or container. Phentermine:  Take phentermine 37.5 mg, one-half to one tablet daily as needed for appetite suppression. Take each dose 30-90 min before effect will be needed. While taking phentermine, check the Blood Pressure every morning and every evening. If the systolic BP is >020 mmHg, the diastolic BP is >59 mm/Hg or the heart rate is > 100 beats per minute, do not take phentermine that day. If the systolic BP is consistently >155 mmHg, the diastolic BP is consistently above 90 mm/Hg or the heart rate is consistently > 100 beats per minute, then stop taking phentermine altogether. If the systolic BP >611 mmHg or the diastolic BP is >813 mmHg (even if it is only once), then phentermine should be stopped altogether without proving that any of these are consistently elevated.

## 2023-07-19 ENCOUNTER — OFFICE VISIT (OUTPATIENT)
Dept: BARIATRICS/WEIGHT MGMT | Age: 43
End: 2023-07-19
Payer: COMMERCIAL

## 2023-07-19 VITALS
SYSTOLIC BLOOD PRESSURE: 130 MMHG | HEART RATE: 92 BPM | TEMPERATURE: 98.2 F | DIASTOLIC BLOOD PRESSURE: 86 MMHG | HEIGHT: 60 IN | BODY MASS INDEX: 31.73 KG/M2 | WEIGHT: 161.6 LBS

## 2023-07-19 DIAGNOSIS — E66.09 CLASS 1 OBESITY DUE TO EXCESS CALORIES WITH SERIOUS COMORBIDITY AND BODY MASS INDEX (BMI) OF 32.0 TO 32.9 IN ADULT: ICD-10-CM

## 2023-07-19 DIAGNOSIS — I10 ESSENTIAL HYPERTENSION: Primary | ICD-10-CM

## 2023-07-19 PROCEDURE — G8428 CUR MEDS NOT DOCUMENT: HCPCS | Performed by: INTERNAL MEDICINE

## 2023-07-19 PROCEDURE — 3075F SYST BP GE 130 - 139MM HG: CPT | Performed by: INTERNAL MEDICINE

## 2023-07-19 PROCEDURE — 3079F DIAST BP 80-89 MM HG: CPT | Performed by: INTERNAL MEDICINE

## 2023-07-19 PROCEDURE — 99214 OFFICE O/P EST MOD 30 MIN: CPT | Performed by: INTERNAL MEDICINE

## 2023-07-19 PROCEDURE — 1036F TOBACCO NON-USER: CPT | Performed by: INTERNAL MEDICINE

## 2023-07-19 PROCEDURE — 99211 OFF/OP EST MAY X REQ PHY/QHP: CPT

## 2023-07-19 PROCEDURE — G8417 CALC BMI ABV UP PARAM F/U: HCPCS | Performed by: INTERNAL MEDICINE

## 2023-07-19 RX ORDER — BUPROPION HYDROCHLORIDE 150 MG/1
150 TABLET ORAL EVERY MORNING
Qty: 30 TABLET | Refills: 1 | Status: SHIPPED | OUTPATIENT
Start: 2023-07-19

## 2023-07-19 NOTE — PATIENT INSTRUCTIONS
Rules:  Count every calorie every day  Limit sweets to one day per month  Limit chips/crackers/pretzels/nuts/popcorn to 100 sailaja/day  Eliminate all sugar sweetened beverages (including fruit juice)  Limit restaurants (including fast food and food from a convenience store) to one time every two weeks while in town    Requirements:  Make sure protein intake is at least 65 grams per day  Make sure that fiber intake is at least 22 grams per day  Take one multivitamin every day    Targets:  Limit calorie intake to 1200 calories/day  Walk 30 minutes daily  Avoid eating 2 hours within bedtime. Wellbutrin:  Take Bupropion XL (Wellbutrin XL) 150 mg, one tablet every morning. While taking it, check the BP twice each day for one week. If the systolic BP is >685 mmHg or the diastolic BP is >45 mmHg consistently, then stop taking it. Follow up in about 8 weeks.

## 2023-08-02 ENCOUNTER — INITIAL CONSULT (OUTPATIENT)
Dept: BARIATRICS/WEIGHT MGMT | Age: 43
End: 2023-08-02

## 2023-08-02 VITALS — WEIGHT: 160 LBS | BODY MASS INDEX: 31.41 KG/M2 | HEIGHT: 60 IN

## 2023-08-02 DIAGNOSIS — Z71.3 DIETARY COUNSELING: Primary | ICD-10-CM

## 2023-08-02 PROCEDURE — 99999 PR OFFICE/OUTPT VISIT,PROCEDURE ONLY: CPT | Performed by: DIETITIAN, REGISTERED

## 2023-08-02 NOTE — PROGRESS NOTES
Provided initial weight loss diet counseling to pt. Pt weighed 160 lbs today, indicating a loss of 24.2 lbs since beginning weight loss plan (184.2 lbs on 10/12/22). Plan is as follows, per Dr Rooney Fish:    Rules:  Count every calorie every day  Limit sweets to one day per month  Limit chips/crackers/pretzels/nuts/popcorn to 100 sailaja/day  Eliminate all sugar sweetened beverages (including fruit juice)  Limit restaurants (including fast food and food from a convenience store) to one time every two weeks while in town     Requirements:  Make sure protein intake is at least 65 grams per day  Make sure that fiber intake is at least 22 grams per day  Take one multivitamin every day     Targets:  Limit calorie intake to 1200 calories/day  Walk 30 minutes daily  Avoid eating 2 hours within bedtime. Wellbutrin:    Reviewed pt's current diet plan and level of compliance with plan. Pt is very knowledgeable regarding her plan. From all discussion today, and the weight that pt has lost, pt has done very well and is overall compliant with her plan. Discussed meal planning and low calorie food choices, and calorie density with pt. Will follow up with pt. Pt was very appreciative for information provided, which she believes will assist her in continuing weight loss. Provided pt with the following printed handouts to support the current diet plan and ddress any current dietary issues:  1200 calorie meal plan, Low Calorie Snack Ideas list, meal plate picture, and the calorie density illustration. All questions answered. Pt is free to call [phone] for any further questions. Follow up scheduled for 6 weeks.

## 2023-08-15 DIAGNOSIS — E66.09 CLASS 1 OBESITY DUE TO EXCESS CALORIES WITH SERIOUS COMORBIDITY AND BODY MASS INDEX (BMI) OF 34.0 TO 34.9 IN ADULT: ICD-10-CM

## 2023-09-07 RX ORDER — NALTREXONE HYDROCHLORIDE AND BUPROPION HYDROCHLORIDE 8; 90 MG/1; MG/1
TABLET, EXTENDED RELEASE ORAL
Qty: 120 TABLET | Refills: 1 | OUTPATIENT
Start: 2023-09-07

## 2023-09-29 ASSESSMENT — ENCOUNTER SYMPTOMS
SHORTNESS OF BREATH: 0
COUGH: 0

## 2023-09-29 NOTE — PROGRESS NOTES
Subjective:      Patient ID: Lucía Tabares is a 37 y.o. 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 CT chest from 9/11/23 shows a lobulated 1.3x0.7 cm structure in medial RLL. Serpiginous feeding vessels are noted arising from right pulm artery and inferior right pulm vein. Patient at presents denies SOB, hemoptysis. Cough, dizziness, or CP    HPI    Review of Systems   Constitutional:  Negative for chills and fever. Respiratory:  Negative for cough and shortness of breath. Cardiovascular:  Negative for chest pain and palpitations. Neurological:  Negative for syncope and light-headedness. Objective:   Physical Exam  Constitutional:       Appearance: Normal appearance. Cardiovascular:      Rate and Rhythm: Normal rate and regular rhythm. Pulses: Normal pulses. Heart sounds: Normal heart sounds. Pulmonary:      Effort: Pulmonary effort is normal.      Breath sounds: Normal breath sounds. Abdominal:      General: Bowel sounds are normal.   Musculoskeletal:         General: Normal range of motion. Cervical back: Normal range of motion and neck supple. Neurological:      Mental Status: She is alert and oriented to person, place, and time. Assessment:      AVF RLL      Plan: It certainly looks like an AVF. She is healthy otherwise. I think it would be a straightforward robotic wedge. All risks, benefits, alternatives and potential complications explained thoroughly including, but not limited to, bleeding, infection, lung injury, kidney injury, stroke, heart attack, prolonged ventilation, wound complication, need for re-operation, and death, and the patient wishes to think about it. I gave her our card and she will let us know.

## 2023-10-03 ENCOUNTER — INITIAL CONSULT (OUTPATIENT)
Dept: CARDIOTHORACIC SURGERY | Age: 43
End: 2023-10-03
Payer: COMMERCIAL

## 2023-10-03 VITALS
DIASTOLIC BLOOD PRESSURE: 96 MMHG | HEART RATE: 95 BPM | WEIGHT: 156 LBS | HEIGHT: 59 IN | SYSTOLIC BLOOD PRESSURE: 141 MMHG | BODY MASS INDEX: 31.45 KG/M2

## 2023-10-03 DIAGNOSIS — Q25.72 PULMONARY ARTERIOVENOUS MALFORMATION: Primary | ICD-10-CM

## 2023-10-03 PROCEDURE — 3080F DIAST BP >= 90 MM HG: CPT | Performed by: THORACIC SURGERY (CARDIOTHORACIC VASCULAR SURGERY)

## 2023-10-03 PROCEDURE — 1036F TOBACCO NON-USER: CPT | Performed by: THORACIC SURGERY (CARDIOTHORACIC VASCULAR SURGERY)

## 2023-10-03 PROCEDURE — 99203 OFFICE O/P NEW LOW 30 MIN: CPT | Performed by: THORACIC SURGERY (CARDIOTHORACIC VASCULAR SURGERY)

## 2023-10-03 PROCEDURE — G8417 CALC BMI ABV UP PARAM F/U: HCPCS | Performed by: THORACIC SURGERY (CARDIOTHORACIC VASCULAR SURGERY)

## 2023-10-03 PROCEDURE — 3077F SYST BP >= 140 MM HG: CPT | Performed by: THORACIC SURGERY (CARDIOTHORACIC VASCULAR SURGERY)

## 2023-10-03 PROCEDURE — G8484 FLU IMMUNIZE NO ADMIN: HCPCS | Performed by: THORACIC SURGERY (CARDIOTHORACIC VASCULAR SURGERY)

## 2023-10-03 PROCEDURE — G8427 DOCREV CUR MEDS BY ELIG CLIN: HCPCS | Performed by: THORACIC SURGERY (CARDIOTHORACIC VASCULAR SURGERY)

## 2023-10-05 ENCOUNTER — TELEPHONE (OUTPATIENT)
Dept: CARDIOTHORACIC SURGERY | Age: 43
End: 2023-10-05

## 2023-10-05 ENCOUNTER — PREP FOR PROCEDURE (OUTPATIENT)
Dept: CARDIOTHORACIC SURGERY | Age: 43
End: 2023-10-05

## 2023-10-05 DIAGNOSIS — J98.4 LUNG ABNORMALITY: ICD-10-CM

## 2023-10-05 DIAGNOSIS — Q27.30 AVM (ARTERIOVENOUS MALFORMATION): Primary | ICD-10-CM

## 2023-10-05 RX ORDER — SODIUM CHLORIDE 0.9 % (FLUSH) 0.9 %
5-40 SYRINGE (ML) INJECTION EVERY 12 HOURS SCHEDULED
Status: CANCELLED | OUTPATIENT
Start: 2023-10-05

## 2023-10-05 RX ORDER — SODIUM CHLORIDE 9 MG/ML
INJECTION, SOLUTION INTRAVENOUS CONTINUOUS
Status: CANCELLED | OUTPATIENT
Start: 2023-10-05

## 2023-10-05 RX ORDER — SODIUM CHLORIDE 9 MG/ML
INJECTION, SOLUTION INTRAVENOUS PRN
Status: CANCELLED | OUTPATIENT
Start: 2023-10-05

## 2023-10-05 RX ORDER — SODIUM CHLORIDE 0.9 % (FLUSH) 0.9 %
5-40 SYRINGE (ML) INJECTION PRN
Status: CANCELLED | OUTPATIENT
Start: 2023-10-05

## 2023-10-05 NOTE — TELEPHONE ENCOUNTER
PT HAS DECIDED TO GO FORWARD WITH SURGERY. SHE STILL HAS QUESTIONS SHE WOULD LIKE TO DISCUSS WITH SOMEONE.

## 2023-10-10 DIAGNOSIS — Q27.30 AVM (ARTERIOVENOUS MALFORMATION): Primary | ICD-10-CM

## 2023-10-13 ENCOUNTER — APPOINTMENT (OUTPATIENT)
Dept: GENERAL RADIOLOGY | Age: 43
End: 2023-10-13
Payer: COMMERCIAL

## 2023-10-13 ENCOUNTER — APPOINTMENT (OUTPATIENT)
Dept: CT IMAGING | Age: 43
End: 2023-10-13
Payer: COMMERCIAL

## 2023-10-13 ENCOUNTER — TELEPHONE (OUTPATIENT)
Dept: CARDIOTHORACIC SURGERY | Age: 43
End: 2023-10-13

## 2023-10-13 ENCOUNTER — HOSPITAL ENCOUNTER (EMERGENCY)
Age: 43
Discharge: HOME | End: 2023-10-13
Attending: EMERGENCY MEDICINE
Payer: COMMERCIAL

## 2023-10-13 ENCOUNTER — HOSPITAL ENCOUNTER (OUTPATIENT)
Dept: PREADMISSION TESTING | Age: 43
Discharge: HOME | End: 2023-10-13

## 2023-10-13 VITALS
TEMPERATURE: 97.7 F | SYSTOLIC BLOOD PRESSURE: 125 MMHG | RESPIRATION RATE: 20 BRPM | HEART RATE: 93 BPM | DIASTOLIC BLOOD PRESSURE: 88 MMHG | OXYGEN SATURATION: 98 %

## 2023-10-13 DIAGNOSIS — R10.13 EPIGASTRIC PAIN: Primary | ICD-10-CM

## 2023-10-13 DIAGNOSIS — K29.00 ACUTE GASTRITIS, PRESENCE OF BLEEDING UNSPECIFIED, UNSPECIFIED GASTRITIS TYPE: ICD-10-CM

## 2023-10-13 LAB
ALBUMIN SERPL-MCNC: 4.2 G/DL (ref 3.5–5.2)
ALP SERPL-CCNC: 104 U/L (ref 35–104)
ALT SERPL-CCNC: 24 U/L (ref 0–32)
ANION GAP SERPL CALCULATED.3IONS-SCNC: 12 MMOL/L (ref 7–16)
AST SERPL-CCNC: 13 U/L (ref 0–31)
BASOPHILS # BLD: 0.04 K/UL (ref 0–0.2)
BASOPHILS NFR BLD: 1 % (ref 0–2)
BILIRUB SERPL-MCNC: <0.2 MG/DL (ref 0–1.2)
BUN SERPL-MCNC: 9 MG/DL (ref 6–20)
CALCIUM SERPL-MCNC: 8.9 MG/DL (ref 8.6–10.2)
CHLORIDE SERPL-SCNC: 104 MMOL/L (ref 98–107)
CO2 SERPL-SCNC: 24 MMOL/L (ref 22–29)
CREAT SERPL-MCNC: 0.7 MG/DL (ref 0.5–1)
EKG ATRIAL RATE: 85 BPM
EKG P AXIS: 61 DEGREES
EKG P-R INTERVAL: 150 MS
EKG Q-T INTERVAL: 392 MS
EKG QRS DURATION: 82 MS
EKG QTC CALCULATION (BAZETT): 466 MS
EKG R AXIS: 14 DEGREES
EKG T AXIS: 11 DEGREES
EKG VENTRICULAR RATE: 85 BPM
EOSINOPHIL # BLD: 0.12 K/UL (ref 0.05–0.5)
EOSINOPHILS RELATIVE PERCENT: 2 % (ref 0–6)
ERYTHROCYTE [DISTWIDTH] IN BLOOD BY AUTOMATED COUNT: 18.8 % (ref 11.5–15)
GFR SERPL CREATININE-BSD FRML MDRD: >60 ML/MIN/1.73M2
GLUCOSE SERPL-MCNC: 107 MG/DL (ref 74–99)
HCG UR QL: NEGATIVE
HCT VFR BLD AUTO: 32.6 % (ref 34–48)
HGB BLD-MCNC: 9.6 G/DL (ref 11.5–15.5)
IMM GRANULOCYTES # BLD AUTO: <0.03 K/UL (ref 0–0.58)
IMM GRANULOCYTES NFR BLD: 0 % (ref 0–5)
LACTATE BLDV-SCNC: 1.6 MMOL/L (ref 0.5–2.2)
LIPASE SERPL-CCNC: 79 U/L (ref 13–60)
LYMPHOCYTES NFR BLD: 1.34 K/UL (ref 1.5–4)
LYMPHOCYTES RELATIVE PERCENT: 18 % (ref 20–42)
MAGNESIUM SERPL-MCNC: 2.2 MG/DL (ref 1.6–2.6)
MCH RBC QN AUTO: 23.5 PG (ref 26–35)
MCHC RBC AUTO-ENTMCNC: 29.4 G/DL (ref 32–34.5)
MCV RBC AUTO: 79.7 FL (ref 80–99.9)
MONOCYTES NFR BLD: 0.4 K/UL (ref 0.1–0.95)
MONOCYTES NFR BLD: 5 % (ref 2–12)
NEUTROPHILS NFR BLD: 74 % (ref 43–80)
NEUTS SEG NFR BLD: 5.5 K/UL (ref 1.8–7.3)
PLATELET # BLD AUTO: 439 K/UL (ref 130–450)
PMV BLD AUTO: 10.3 FL (ref 7–12)
POTASSIUM SERPL-SCNC: 3.5 MMOL/L (ref 3.5–5)
PROT SERPL-MCNC: 6.9 G/DL (ref 6.4–8.3)
RBC # BLD AUTO: 4.09 M/UL (ref 3.5–5.5)
SODIUM SERPL-SCNC: 140 MMOL/L (ref 132–146)
TROPONIN I SERPL HS-MCNC: <6 NG/L (ref 0–9)
WBC OTHER # BLD: 7.4 K/UL (ref 4.5–11.5)

## 2023-10-13 PROCEDURE — 84484 ASSAY OF TROPONIN QUANT: CPT

## 2023-10-13 PROCEDURE — 93005 ELECTROCARDIOGRAM TRACING: CPT

## 2023-10-13 PROCEDURE — 81001 URINALYSIS AUTO W/SCOPE: CPT

## 2023-10-13 PROCEDURE — C9113 INJ PANTOPRAZOLE SODIUM, VIA: HCPCS

## 2023-10-13 PROCEDURE — 2580000003 HC RX 258: Performed by: RADIOLOGY

## 2023-10-13 PROCEDURE — 83735 ASSAY OF MAGNESIUM: CPT

## 2023-10-13 PROCEDURE — 6360000002 HC RX W HCPCS

## 2023-10-13 PROCEDURE — 74177 CT ABD & PELVIS W/CONTRAST: CPT

## 2023-10-13 PROCEDURE — 93010 ELECTROCARDIOGRAM REPORT: CPT | Performed by: INTERNAL MEDICINE

## 2023-10-13 PROCEDURE — 6360000004 HC RX CONTRAST MEDICATION: Performed by: RADIOLOGY

## 2023-10-13 PROCEDURE — 85025 COMPLETE CBC W/AUTO DIFF WBC: CPT

## 2023-10-13 PROCEDURE — 71045 X-RAY EXAM CHEST 1 VIEW: CPT

## 2023-10-13 PROCEDURE — 83605 ASSAY OF LACTIC ACID: CPT

## 2023-10-13 PROCEDURE — 96374 THER/PROPH/DIAG INJ IV PUSH: CPT

## 2023-10-13 PROCEDURE — A4216 STERILE WATER/SALINE, 10 ML: HCPCS

## 2023-10-13 PROCEDURE — 2580000003 HC RX 258

## 2023-10-13 PROCEDURE — 80053 COMPREHEN METABOLIC PANEL: CPT

## 2023-10-13 PROCEDURE — 84703 CHORIONIC GONADOTROPIN ASSAY: CPT

## 2023-10-13 PROCEDURE — 83690 ASSAY OF LIPASE: CPT

## 2023-10-13 PROCEDURE — 99285 EMERGENCY DEPT VISIT HI MDM: CPT

## 2023-10-13 RX ORDER — SODIUM CHLORIDE 0.9 % (FLUSH) 0.9 %
10 SYRINGE (ML) INJECTION
Status: COMPLETED | OUTPATIENT
Start: 2023-10-13 | End: 2023-10-13

## 2023-10-13 RX ORDER — 0.9 % SODIUM CHLORIDE 0.9 %
1000 INTRAVENOUS SOLUTION INTRAVENOUS ONCE
Status: COMPLETED | OUTPATIENT
Start: 2023-10-13 | End: 2023-10-13

## 2023-10-13 RX ORDER — OMEPRAZOLE 20 MG/1
20 CAPSULE, DELAYED RELEASE ORAL
Qty: 30 CAPSULE | Refills: 0 | Status: SHIPPED | OUTPATIENT
Start: 2023-10-13

## 2023-10-13 RX ADMIN — SODIUM CHLORIDE 1000 ML: 9 INJECTION, SOLUTION INTRAVENOUS at 08:36

## 2023-10-13 RX ADMIN — Medication 10 ML: at 12:46

## 2023-10-13 RX ADMIN — IOPAMIDOL 75 ML: 755 INJECTION, SOLUTION INTRAVENOUS at 12:46

## 2023-10-13 RX ADMIN — SODIUM CHLORIDE 40 MG: 9 INJECTION INTRAMUSCULAR; INTRAVENOUS; SUBCUTANEOUS at 08:36

## 2023-10-13 ASSESSMENT — PAIN - FUNCTIONAL ASSESSMENT: PAIN_FUNCTIONAL_ASSESSMENT: NONE - DENIES PAIN

## 2023-10-13 ASSESSMENT — LIFESTYLE VARIABLES
HOW MANY STANDARD DRINKS CONTAINING ALCOHOL DO YOU HAVE ON A TYPICAL DAY: 1 OR 2
HOW OFTEN DO YOU HAVE A DRINK CONTAINING ALCOHOL: MONTHLY OR LESS

## 2023-10-13 NOTE — PROGRESS NOTES
Patient unable to make PAT appt. Patient is currently in the ER with severe abdominal pain, will need PAT appt rescheduled. Left message with Sharon Raines at Dr. Zack Young office regarding above.

## 2023-10-13 NOTE — TELEPHONE ENCOUNTER
PAT stated Patient unable to make PAT appt. Patient is currently in the ER with severe abdominal pain.

## 2023-10-13 NOTE — DISCHARGE INSTRUCTIONS
Please return to the ER for any new or worsening symptoms including but not limited to Fever or Worsening abdominal pain  If prescribed, please be sure to  your prescriptions from the pharmacy  Please follow-up with Primary care provider and General surgery as instructed

## 2023-10-13 NOTE — ED PROVIDER NOTES
[place]        Pt Name: Ruddy Guardado  MRN: [id number]  [address] [place] 1980  Date of evaluation: 10/13/2023  Provider: Meme Corrigan DO  PCP: STEPHANI Haji CNP  Note Started: 8:11 AM EDT 10/13/23    CHIEF COMPLAINT       Chief Complaint   Patient presents with    Abdominal Pain     Abdominal pain that radiates up chest causing nausea. Was here originally here for pre op PFT and blood work but was not feeling well and came to ED. HISTORY OF PRESENT ILLNESS: 1 or more Elements   History From: Patient    Limitations to history : None  Social Determinants : None    Ruddy Guardado is a 37 y.o. female who presents for abdominal pain. Patient states that she started having abdominal pain 2 days ago. It has gotten worse yesterday and this morning. She describes it as cramping pain that comes and goes. She localizes to the epigastric region and she states it goes up middle of her sternum. She gets nausea with the pain. She has had no vomiting. Her last bowel movement was 2 days ago. It was normal.  She has had 2 C-sections, no other abdominal surgeries. She has a history of PCOS and is on her menstrual cycle right now. Denies any fever, chills, n/v, headache, dizziness, palpitations, sob, dysuria, hematuria, diarrhea, constipation, bloody stools. Nursing Notes were all reviewed and agreed with or any disagreements were addressed in the HPI.    ROS:   Pertinent positives and negatives are stated within HPI, all other systems reviewed and are negative.      --------------------------------------------- PAST HISTORY ---------------------------------------------  Past Medical History:  has a past medical history of Asthma, Hypertension, PCOS (polycystic ovarian syndrome), and Thyroid disease.     Past Surgical History:  has a past surgical history that includes  section; LEEP; Abdomen surgery;

## 2023-10-14 LAB
BILIRUB UR QL STRIP: NEGATIVE
CLARITY UR: ABNORMAL
COLOR UR: ABNORMAL
GLUCOSE UR STRIP-MCNC: NEGATIVE MG/DL
HGB UR QL STRIP.AUTO: ABNORMAL
KETONES UR STRIP-MCNC: NEGATIVE MG/DL
LEUKOCYTE ESTERASE UR QL STRIP: ABNORMAL
NITRITE UR QL STRIP: NEGATIVE
PH UR STRIP: 7 [PH] (ref 5–9)
PROT UR STRIP-MCNC: 100 MG/DL
RBC #/AREA URNS HPF: ABNORMAL /HPF
SP GR UR STRIP: 1.01 (ref 1–1.03)
UROBILINOGEN UR STRIP-ACNC: 0.2 EU/DL (ref 0–1)
WBC #/AREA URNS HPF: ABNORMAL /HPF

## 2023-10-16 ENCOUNTER — HOSPITAL ENCOUNTER (OUTPATIENT)
Dept: PREADMISSION TESTING | Age: 43
Discharge: HOME | End: 2023-10-16

## 2023-10-16 NOTE — PROGRESS NOTES
Patient called into PAT to notify us that she will not be coming to her PAT appointment today and is planning on rescheduling her surgery. Nicolle stated \"I was in the ED on Friday with food poisoning and I still don't feel any better\". I instructed her to call Dr. Bernice Jarvis office and notify them of the above. Nicolle stated that she will be calling them as soon as the office opens up today.

## 2023-10-17 ENCOUNTER — TELEPHONE (OUTPATIENT)
Dept: CARDIOTHORACIC SURGERY | Age: 43
End: 2023-10-17

## 2023-10-17 NOTE — TELEPHONE ENCOUNTER
Pt informed of new surgery date 11/7. She would like to either come in to talk to Dr or have Dr call her. She has questions about the surgery, since it was a surprise to her she didn't ask many questions at the time.

## 2023-10-24 NOTE — PROGRESS NOTES
Subjective:      Patient ID: Jordan Foster is a 37 y.o. female. HPI: Jordan Foster is a 37 y.o. female who presents to review questions and concerns prior to surgery. 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 CT chest from 9/11/23 shows a lobulated 1.3x0.7 cm structure in medial RLL. Serpiginous feeding vessels are noted arising from right pulm artery and inferior right pulm vein. Patient at presents denies SOB, hemoptysis. Cough, dizziness, or CP. It was recommended that she undergo Robotic RLLobectomy on 10/18 but unfortunately the patient contracted food poisoning and the surgery needed to be rescheduled. She is now scheduled for 11/7/2023 at 1200. Review of Systems   Constitutional:  Negative for chills and fever. Respiratory:  Negative for cough and shortness of breath. Cardiovascular:  Negative for chest pain and palpitations. Neurological:  Negative for syncope and light-headedness      Objective:   Physical Exam  Constitutional:       Appearance: Normal appearance. Cardiovascular:      Rate and Rhythm: Normal rate and regular rhythm. Pulses: Normal pulses. Heart sounds: Normal heart sounds. Pulmonary:      Effort: Pulmonary effort is normal.      Breath sounds: Normal breath sounds. Abdominal:      General: Bowel sounds are normal.   Musculoskeletal:         General: Normal range of motion. Cervical back: Normal range of motion and neck supple. Neurological:      Mental Status: She is alert and oriented to person, place, and time. Assessment:      AVM RLL      Plan:      Several questions answered. She wishes to continue to think about it and will let us know.

## 2023-10-31 ENCOUNTER — OFFICE VISIT (OUTPATIENT)
Dept: CARDIOTHORACIC SURGERY | Age: 43
End: 2023-10-31
Payer: COMMERCIAL

## 2023-10-31 VITALS
WEIGHT: 160 LBS | SYSTOLIC BLOOD PRESSURE: 135 MMHG | DIASTOLIC BLOOD PRESSURE: 82 MMHG | HEART RATE: 102 BPM | BODY MASS INDEX: 32.25 KG/M2 | HEIGHT: 59 IN

## 2023-10-31 DIAGNOSIS — Q27.30 AVM (ARTERIOVENOUS MALFORMATION): Primary | ICD-10-CM

## 2023-10-31 PROCEDURE — 3075F SYST BP GE 130 - 139MM HG: CPT | Performed by: THORACIC SURGERY (CARDIOTHORACIC VASCULAR SURGERY)

## 2023-10-31 PROCEDURE — G8484 FLU IMMUNIZE NO ADMIN: HCPCS | Performed by: THORACIC SURGERY (CARDIOTHORACIC VASCULAR SURGERY)

## 2023-10-31 PROCEDURE — 99214 OFFICE O/P EST MOD 30 MIN: CPT | Performed by: THORACIC SURGERY (CARDIOTHORACIC VASCULAR SURGERY)

## 2023-10-31 PROCEDURE — 3079F DIAST BP 80-89 MM HG: CPT | Performed by: THORACIC SURGERY (CARDIOTHORACIC VASCULAR SURGERY)

## 2023-10-31 PROCEDURE — G8427 DOCREV CUR MEDS BY ELIG CLIN: HCPCS | Performed by: THORACIC SURGERY (CARDIOTHORACIC VASCULAR SURGERY)

## 2023-10-31 PROCEDURE — G8417 CALC BMI ABV UP PARAM F/U: HCPCS | Performed by: THORACIC SURGERY (CARDIOTHORACIC VASCULAR SURGERY)

## 2023-10-31 PROCEDURE — 1036F TOBACCO NON-USER: CPT | Performed by: THORACIC SURGERY (CARDIOTHORACIC VASCULAR SURGERY)

## 2023-11-03 ENCOUNTER — TELEPHONE (OUTPATIENT)
Dept: CARDIOTHORACIC SURGERY | Age: 43
End: 2023-11-03

## 2023-11-22 ENCOUNTER — HOSPITAL ENCOUNTER (EMERGENCY)
Age: 43
Discharge: HOME | End: 2023-11-22
Payer: COMMERCIAL

## 2023-11-22 ENCOUNTER — APPOINTMENT (OUTPATIENT)
Dept: GENERAL RADIOLOGY | Age: 43
End: 2023-11-22
Payer: COMMERCIAL

## 2023-11-22 VITALS
OXYGEN SATURATION: 100 % | DIASTOLIC BLOOD PRESSURE: 79 MMHG | TEMPERATURE: 97.9 F | BODY MASS INDEX: 32.06 KG/M2 | WEIGHT: 158.73 LBS | SYSTOLIC BLOOD PRESSURE: 129 MMHG | RESPIRATION RATE: 16 BRPM | HEART RATE: 92 BPM

## 2023-11-22 DIAGNOSIS — M25.462 KNEE EFFUSION, LEFT: ICD-10-CM

## 2023-11-22 DIAGNOSIS — M25.562 ACUTE PAIN OF LEFT KNEE: Primary | ICD-10-CM

## 2023-11-22 PROCEDURE — 99284 EMERGENCY DEPT VISIT MOD MDM: CPT

## 2023-11-22 PROCEDURE — 96372 THER/PROPH/DIAG INJ SC/IM: CPT

## 2023-11-22 PROCEDURE — 73562 X-RAY EXAM OF KNEE 3: CPT

## 2023-11-22 PROCEDURE — 6360000002 HC RX W HCPCS: Performed by: NURSE PRACTITIONER

## 2023-11-22 RX ORDER — KETOROLAC TROMETHAMINE 30 MG/ML
30 INJECTION, SOLUTION INTRAMUSCULAR; INTRAVENOUS ONCE
Status: COMPLETED | OUTPATIENT
Start: 2023-11-22 | End: 2023-11-22

## 2023-11-22 RX ORDER — NAPROXEN 500 MG/1
500 TABLET ORAL 2 TIMES DAILY PRN
Qty: 14 TABLET | Refills: 0 | Status: SHIPPED | OUTPATIENT
Start: 2023-11-22 | End: 2023-11-29

## 2023-11-22 RX ADMIN — KETOROLAC TROMETHAMINE 30 MG: 30 INJECTION, SOLUTION INTRAMUSCULAR; INTRAVENOUS at 15:58

## 2023-11-22 ASSESSMENT — PAIN - FUNCTIONAL ASSESSMENT
PAIN_FUNCTIONAL_ASSESSMENT: ACTIVITIES ARE NOT PREVENTED
PAIN_FUNCTIONAL_ASSESSMENT: 0-10

## 2023-11-22 ASSESSMENT — PAIN DESCRIPTION - LOCATION: LOCATION: KNEE

## 2023-11-22 ASSESSMENT — PAIN DESCRIPTION - ORIENTATION: ORIENTATION: LEFT

## 2023-11-22 ASSESSMENT — PAIN SCALES - GENERAL
PAINLEVEL_OUTOF10: 9
PAINLEVEL_OUTOF10: 9
PAINLEVEL_OUTOF10: 5

## 2023-11-22 NOTE — ED PROVIDER NOTES
Independent [facility] Visit. [facility]   ED  Encounter Note  Admit Date/RoomTime: 2023  3:40 PM  ED Room: 32    NAME: Jordan Foster  : 1980  MRN: [id number]     Chief Complaint:  Knee Pain (Left knee pain and swelling, denies injury. )    History of Present Illness       Jordan Foster is a 37 y.o. old female who presents to the emergency department by private vehicle alone for non-traumatic pain located medial aspect  to left knee  which occured 1 day(s) prior to arrival.  The complaint is due to no known cause. Since onset the symptoms have been persistent and gradually worsening. Patient has no prior history of pain/injury with regards to today's visit. Her pain is aggraveated by weight bearing and palpation and relieved by nothing and did take motrin last night with no relief and works as a para legal. She reports she did fall a month ago and twisted the ankle but it has no pain to the ankle today and arrived with a steady gait with no assistive devices and does not feel any buckling or giving out of the joint. Her weight bearing ability is: normal.  She denies any fever, chills, redness or warmth to the joint or any history of gout. ROS   Pertinent positives and negatives are stated within HPI, all other systems reviewed and are negative. Past Medical History:  has a past medical history of Asthma, Hypertension, PCOS (polycystic ovarian syndrome), and Thyroid disease. Surgical History:  has a past surgical history that includes  section; LEEP; Abdomen surgery; Cervical Cerclage (2013); and Thyroidectomy, partial.    Social History:  reports that she has never smoked. She has never used smokeless tobacco. She reports current alcohol use. She reports that she does not use drugs. Family History: family history includes Diabetes in her mother; High Blood Pressure in her mother.      Allergies: Latex and

## 2023-11-22 NOTE — DISCHARGE INSTR - COC
Continuity of Care Form    Patient Name: Dora Khanna   :  1980  MRN:  [id number]    Admit date:  2023  Discharge date:  ***    Code Status Order: Prior   Advance Directives:     Admitting Physician:  No admitting provider for patient encounter. PCP: STEPHANI Krishnamurthy CNP    Discharging Nurse: [facility] Unit/Room#:   Discharging Unit Phone Number: ***    Emergency Contact:   Extended Emergency Contact Information  Primary Emergency Contact: Mike Cantu [facility] of [address] Phone: [phone]  Mobile Phone: [phone]  Relation: Child   needed?  No    Past Surgical History:  Past Surgical History:   Procedure Laterality Date    ABDOMEN SURGERY      csection    CERVICAL CERCLAGE  2013     SECTION      LEEP      THYROIDECTOMY, PARTIAL         Immunization History:   Immunization History   Administered Date(s) Administered    COVID-19, PFIZER PURPLE top, DILUTE for use, (age 15 y+), 30mcg/0.3mL 2021, 2021, 2021    TDaP, ADACEL (age 6y-58y), [facility] (age 10y+), IM, 0.5mL 2013       Active Problems:  Patient Active Problem List   Diagnosis Code    Asthma V15.039    Obesity complicating pregnancy, childbirth, or puerperium, antepartum O99.210    Obese E66.9    Benign essential hypertension, antepartum O10.019    Cervical shortening, antepartum condition or complication Z38.159    Other congenital or acquired abnormality of cervix, antepartum condition or complication X23.53    Incompetent cervix in pregnancy s/p rescue cerclage 3/23/13 O34.30    Premature cervical dilation O34.30    Threatened  labor O47.00    Oligohydramnios with  problem O41.00X0     premature rupture of membranes 13 O42.919    SPROM (prolonged spontaneous rupture of membranes) O42.90    Essential hypertension I10    Polycystic ovarian disease E28.2    Dehydration E86.0    Sepsis ([address]) A41.9    Hypokalemia E87.6

## 2023-11-27 ENCOUNTER — OFFICE VISIT (OUTPATIENT)
Dept: BARIATRICS/WEIGHT MGMT | Age: 43
End: 2023-11-27
Payer: COMMERCIAL

## 2023-11-27 VITALS
WEIGHT: 171 LBS | TEMPERATURE: 97.9 F | DIASTOLIC BLOOD PRESSURE: 71 MMHG | HEIGHT: 59 IN | SYSTOLIC BLOOD PRESSURE: 119 MMHG | BODY MASS INDEX: 34.47 KG/M2 | HEART RATE: 86 BPM

## 2023-11-27 DIAGNOSIS — I10 ESSENTIAL HYPERTENSION: Primary | ICD-10-CM

## 2023-11-27 DIAGNOSIS — E66.09 CLASS 1 OBESITY DUE TO EXCESS CALORIES WITH SERIOUS COMORBIDITY AND BODY MASS INDEX (BMI) OF 33.0 TO 33.9 IN ADULT: ICD-10-CM

## 2023-11-27 PROCEDURE — 1036F TOBACCO NON-USER: CPT | Performed by: INTERNAL MEDICINE

## 2023-11-27 PROCEDURE — G8484 FLU IMMUNIZE NO ADMIN: HCPCS | Performed by: INTERNAL MEDICINE

## 2023-11-27 PROCEDURE — G8428 CUR MEDS NOT DOCUMENT: HCPCS | Performed by: INTERNAL MEDICINE

## 2023-11-27 PROCEDURE — 3078F DIAST BP <80 MM HG: CPT | Performed by: INTERNAL MEDICINE

## 2023-11-27 PROCEDURE — G8417 CALC BMI ABV UP PARAM F/U: HCPCS | Performed by: INTERNAL MEDICINE

## 2023-11-27 PROCEDURE — 3074F SYST BP LT 130 MM HG: CPT | Performed by: INTERNAL MEDICINE

## 2023-11-27 PROCEDURE — 99211 OFF/OP EST MAY X REQ PHY/QHP: CPT

## 2023-11-27 PROCEDURE — 99214 OFFICE O/P EST MOD 30 MIN: CPT | Performed by: INTERNAL MEDICINE

## 2023-11-27 RX ORDER — PHENTERMINE HYDROCHLORIDE 37.5 MG/1
37.5 TABLET ORAL DAILY
Qty: 30 TABLET | Refills: 0 | Status: SHIPPED | OUTPATIENT
Start: 2023-11-27 | End: 2023-12-27

## 2023-11-27 NOTE — PATIENT INSTRUCTIONS
Rules:  Count every calorie every day  Limit sweets to one day per month  Limit chips/crackers/pretzels/nuts/popcorn to 100 sailaja/day  Eliminate all sugar sweetened beverages (including fruit juice)  Limit restaurants (including fast food and food from a convenience store) to one time every two weeks while in town    Requirements:  Make sure protein intake is at least 65 grams per day  Make sure that fiber intake is at least 22 grams per day  Take one multivitamin every day    Targets:  Limit calorie intake to 1200 calories/day  Walk 30 minutes daily  Avoid eating 2 hours within bedtime. Phentermine:  Take phentermine 37.5 mg, one-half to one tablet daily as needed for appetite suppression. Take each dose 30-90 min before effect will be needed. While taking phentermine, check the Blood Pressure every morning and every evening. If the systolic BP is >014 mmHg, the diastolic BP is >25 mm/Hg or the heart rate is > 100 beats per minute, do not take phentermine that day. If the systolic BP is consistently >155 mmHg, the diastolic BP is consistently above 90 mm/Hg or the heart rate is consistently > 100 beats per minute, then stop taking phentermine altogether. If the systolic BP >587 mmHg or the diastolic BP is >291 mmHg (even if it is only once), then phentermine should be stopped altogether without proving that any of these are consistently elevated.      Follow up in 1 month

## 2023-11-27 NOTE — PROGRESS NOTES
month. Calorie monitoring: not monitoring in the last 1 week. Sweets: Just for Thanksgiving  SSB: 1-2x/month DD refresher  Restaurant food: 0x/month  Appetite suppressant:Wellbutrin did not really work, just helped maintained   Home BP monitoring: bid, wnl  Protein: tuna, chicken and beans mostly  Fiber: low carb tortillas, fiber capsule, quinoa,  +apple+kale etc  Water: 80-90 oz/d  Activities: not been to the gym in the last 6 weeks. Tries to walk 10-15 min during lunch break. Contraception:  had vasectomy    Assessment  - uncontrolled -> Wellbutrin is not helping. Has been sick in the last month and was not able to follow a low calorie diet plan. Would like to resume Phentermine as it is the only one that has really helped. No GLP-1RA medication as she had thyroid mass and half of it was removed. We planned on resuming Phentermine, no contraindication. Plan   -     Rules:  Count every calorie every day  Limit sweets to one day per month  Limit chips/crackers/pretzels/nuts/popcorn to 100 sailaja/day  Eliminate all sugar sweetened beverages (including fruit juice)  Limit restaurants (including fast food and food from a convenience store) to one time every two weeks while in town    Requirements:  Make sure protein intake is at least 65 grams per day  Make sure that fiber intake is at least 22 grams per day  Take one multivitamin every day    Targets:  Limit calorie intake to 1200 calories/day  Walk 30 minutes daily  Avoid eating 2 hours within bedtime. Phentermine:  Take phentermine 37.5 mg, one-half to one tablet daily as needed for appetite suppression. Take each dose 30-90 min before effect will be needed. While taking phentermine, check the Blood Pressure every morning and every evening. If the systolic BP is >591 mmHg, the diastolic BP is >41 mm/Hg or the heart rate is > 100 beats per minute, do not take phentermine that day.     If the systolic BP is consistently >155 mmHg, the diastolic BP

## 2023-12-20 ENCOUNTER — OFFICE VISIT (OUTPATIENT)
Dept: ENT CLINIC | Age: 43
End: 2023-12-20
Payer: COMMERCIAL

## 2023-12-20 VITALS
WEIGHT: 163 LBS | BODY MASS INDEX: 32.86 KG/M2 | HEIGHT: 59 IN | HEART RATE: 88 BPM | TEMPERATURE: 98.2 F | RESPIRATION RATE: 12 BRPM | DIASTOLIC BLOOD PRESSURE: 84 MMHG | SYSTOLIC BLOOD PRESSURE: 128 MMHG | OXYGEN SATURATION: 99 %

## 2023-12-20 DIAGNOSIS — H93.13 TINNITUS OF BOTH EARS: Primary | ICD-10-CM

## 2023-12-20 PROCEDURE — 99203 OFFICE O/P NEW LOW 30 MIN: CPT

## 2023-12-20 PROCEDURE — G8427 DOCREV CUR MEDS BY ELIG CLIN: HCPCS

## 2023-12-20 PROCEDURE — 3074F SYST BP LT 130 MM HG: CPT

## 2023-12-20 PROCEDURE — G8417 CALC BMI ABV UP PARAM F/U: HCPCS

## 2023-12-20 PROCEDURE — G8484 FLU IMMUNIZE NO ADMIN: HCPCS

## 2023-12-20 PROCEDURE — 1036F TOBACCO NON-USER: CPT

## 2023-12-20 PROCEDURE — 3079F DIAST BP 80-89 MM HG: CPT

## 2023-12-20 NOTE — PROGRESS NOTES
Subjective:     Patient ID:  Ashleigh Hopson is a 37 y.o. female. HPI:    Tinnitus  Patient presents with tinnitus. Onset of symptoms was gradual 5 years ago ago with rapidly worsening course since that time. Patient describes the tinnitus as constant located in the bilateral ear. The quality is described as medium range pitch that sounds like seashells and / white noise. The pattern is nonpulsatile with an intensity that is loud. Patient describes her level of annoyance as  moderately annoying, intermittently aware . Associated symptoms include no hearing loss, pain, dizziness, drainage or recurrent otitis. Family history is negative family history for tinnitusPatient has had no prior evaluation, treatment or surgery for tinnitus  Previous treatments include none. Patient does not have hearing aids at this time. History of Head trauma: no   Description: none  History of surgery to the head/neck: no   Description:none  History of cerumen impaction: no  History of noise exposure: no   Type: none  Spinning: no  Hearing loss: no    Fluctuating: no  Aural pressure: no  Tinnitus:yes  Otalgia:no    Patient states she has noted a constant noise in both her ears over the past 4-5 years, States about 3 months ago she noted rapid worsening that has persisted since that time. Is always aware unless there is some other noise going on.      Past Medical History:   Diagnosis Date    Asthma     Hypertension     Other congenital or acquired abnormality of cervix, antepartum condition or complication     PCOS (polycystic ovarian syndrome)     Thyroid disease      Past Surgical History:   Procedure Laterality Date    ABDOMEN SURGERY      csection    CERVICAL CERCLAGE  2013     SECTION      LEEP      THYROIDECTOMY, PARTIAL       Family History   Problem Relation Age of Onset    High Blood Pressure Mother     Diabetes Mother      Social History     Socioeconomic History    Marital status:

## 2023-12-22 ENCOUNTER — OFFICE VISIT (OUTPATIENT)
Dept: BARIATRICS/WEIGHT MGMT | Age: 43
End: 2023-12-22
Payer: COMMERCIAL

## 2023-12-22 VITALS
DIASTOLIC BLOOD PRESSURE: 74 MMHG | BODY MASS INDEX: 32.28 KG/M2 | SYSTOLIC BLOOD PRESSURE: 127 MMHG | TEMPERATURE: 98.1 F | WEIGHT: 164.4 LBS | HEIGHT: 60 IN | HEART RATE: 90 BPM

## 2023-12-22 DIAGNOSIS — E66.09 CLASS 1 OBESITY DUE TO EXCESS CALORIES WITH SERIOUS COMORBIDITY AND BODY MASS INDEX (BMI) OF 33.0 TO 33.9 IN ADULT: ICD-10-CM

## 2023-12-22 DIAGNOSIS — I10 ESSENTIAL HYPERTENSION: Primary | ICD-10-CM

## 2023-12-22 PROCEDURE — 1036F TOBACCO NON-USER: CPT | Performed by: INTERNAL MEDICINE

## 2023-12-22 PROCEDURE — G8417 CALC BMI ABV UP PARAM F/U: HCPCS | Performed by: INTERNAL MEDICINE

## 2023-12-22 PROCEDURE — 3074F SYST BP LT 130 MM HG: CPT | Performed by: INTERNAL MEDICINE

## 2023-12-22 PROCEDURE — 3078F DIAST BP <80 MM HG: CPT | Performed by: INTERNAL MEDICINE

## 2023-12-22 PROCEDURE — G8428 CUR MEDS NOT DOCUMENT: HCPCS | Performed by: INTERNAL MEDICINE

## 2023-12-22 PROCEDURE — 99214 OFFICE O/P EST MOD 30 MIN: CPT | Performed by: INTERNAL MEDICINE

## 2023-12-22 PROCEDURE — G8484 FLU IMMUNIZE NO ADMIN: HCPCS | Performed by: INTERNAL MEDICINE

## 2023-12-22 RX ORDER — PHENTERMINE HYDROCHLORIDE 37.5 MG/1
TABLET ORAL
Qty: 30 TABLET | Refills: 0 | Status: SHIPPED | OUTPATIENT
Start: 2023-12-22 | End: 2024-01-22

## 2023-12-22 NOTE — PROGRESS NOTES
164.4 lbs  12/22/23  Phentemine  Total weight change to date: -19.8 lbs. Average daily energy variance:  10/16/2022 - 11/16/2022: -6.6 lbs (01749 Micheal)/30 d = -770 Micheal/d deficit. 11/16/2022 - 12/14/2022: +0.0 lbs (0 Micheal)/28 d = +0 Micheal/d excess. 12/14/2022 - 1/19/2023: -8.2 lbs (57998 Micheal)/36 d = -797 Micheal/d deficit. 3/6/2023 - 4/17/2023: -1.6 lbs (5600 Micheal)/42 d = -133 Micheal/d deficit. 4/17/2023 - 5/16/2023: +0.2 lbs (700 Micheal)/29 d = +24 Micheal/d excess. 5/16/2023 - 6/19/2023: -4.6 lbs (36658 Micheal)/34 d = -474 Micheal/d deficit. 6/19/2023 - 7/19/2023: -0.4 lbs (1400 Micheal)/30 d = -47 Micheal/d deficit. 7/19/2023 - 11/27/2023: +9.4 lbs (76189 Micheal)/131 d = +251 Micheal/d excess. 11/27/2023 - 12/22/2023: - 6.6 lbs (23,100 Micheal)/25 d = - 924 Micheal/d deficit (3.8% change in total body weight)  Patient's estimated daily energy need (DEN) = 1776 Micheal/d = 58065 Micheal/wk  Wt effect of HR foods = 2100 Micheal/wk = 300 Micheal/d= 17% DEN = 31 lb/year.      Update:  Diet plan adherence: 7/10  Exercise: Water aerobics ([facility]) 60 min 2d/wk, Resistance 45 min 1d/wk  Appetite suppressant: phentermine 37.5 mg, one-half tab/d alternating with one tab/d; appetite suppression: 8/10; side effects: + contraception  Contraception:  had vasectomy  Assessment    - Improved; needs to increase plan adherence; adding walking would be beneficial; great response to phentermine; no contra-indications to continuing it  Plan                 -      Rules:  Count every calorie every day  Limit sweets to one day per month  Eliminate all sugar sweetened beverages (including fruit juice)  Limit restaurants (including fast food and food from a convenience store) to one time every two weeks while in town     Requirements:  Make sure protein intake is at least 65 grams per day  Make sure that fiber intake is at least 22 grams per day  Take one multivitamin every day     Targets:  Limit calorie intake to 1200 calories/day  Walk 30 minutes daily  Avoid eating 2 hours within

## 2023-12-22 NOTE — PATIENT INSTRUCTIONS
Summary of weight change:                          Weight             Date                          184.2               10/12/22 Phentermine #1                          176.0               11/16/22 Phentermine #2                          176.0               12/14/22 Phentermine #3                          167.8               1/19/23   Contrave - did not work - taper                          168.0               3/6/23                          166.4               4/17/23   Qsymia - made her nauseous -> Phentermine                          166.6               5/16/23   Phentermine contd.                          162.0               6/19/23   Phentermine continued                          161.6               7/19/23   Wellbutrin - stopped after running                          171.0               11/27/23  Phentermine #1     164.4 lbs  12/22/23  Phentemine  Total weight change to date: -19.8 lbs. Average daily energy variance:  10/16/2022 - 11/16/2022: -6.6 lbs (57245 Micheal)/30 d = -770 Micheal/d deficit. 11/16/2022 - 12/14/2022: +0.0 lbs (0 Micheal)/28 d = +0 Micheal/d excess. 12/14/2022 - 1/19/2023: -8.2 lbs (39675 Micheal)/36 d = -797 Micheal/d deficit. 3/6/2023 - 4/17/2023: -1.6 lbs (5600 Micheal)/42 d = -133 Micheal/d deficit. 4/17/2023 - 5/16/2023: +0.2 lbs (700 Micheal)/29 d = +24 Micheal/d excess. 5/16/2023 - 6/19/2023: -4.6 lbs (29665 Micheal)/34 d = -474 Micheal/d deficit. 6/19/2023 - 7/19/2023: -0.4 lbs (1400 Micheal)/30 d = -47 Micheal/d deficit. 7/19/2023 - 11/27/2023: +9.4 lbs (84532 Micheal)/131 d = +251 Micheal/d excess.     11/27/2023 - 12/22/2023: - 6.6 lbs (23,100 Micheal)/25 d = - 924 Micheal/d deficit (3.8% change in total body weight)  ___________________________________    Instructions:     Rules:  Count every calorie every day  Limit sweets to one day per month  Eliminate all sugar sweetened beverages (including fruit juice)  Limit restaurants (including fast food and food from a convenience store) to one time every two weeks while in town

## 2024-01-23 ENCOUNTER — OFFICE VISIT (OUTPATIENT)
Dept: BARIATRICS/WEIGHT MGMT | Age: 44
End: 2024-01-23
Payer: COMMERCIAL

## 2024-01-23 VITALS
HEIGHT: 60 IN | BODY MASS INDEX: 32.31 KG/M2 | TEMPERATURE: 97.5 F | HEART RATE: 88 BPM | DIASTOLIC BLOOD PRESSURE: 74 MMHG | WEIGHT: 164.6 LBS | SYSTOLIC BLOOD PRESSURE: 129 MMHG

## 2024-01-23 DIAGNOSIS — I10 ESSENTIAL HYPERTENSION: Primary | ICD-10-CM

## 2024-01-23 DIAGNOSIS — E66.09 CLASS 1 OBESITY DUE TO EXCESS CALORIES WITH SERIOUS COMORBIDITY AND BODY MASS INDEX (BMI) OF 32.0 TO 32.9 IN ADULT: ICD-10-CM

## 2024-01-23 PROCEDURE — 1036F TOBACCO NON-USER: CPT | Performed by: INTERNAL MEDICINE

## 2024-01-23 PROCEDURE — 99214 OFFICE O/P EST MOD 30 MIN: CPT | Performed by: INTERNAL MEDICINE

## 2024-01-23 PROCEDURE — G8484 FLU IMMUNIZE NO ADMIN: HCPCS | Performed by: INTERNAL MEDICINE

## 2024-01-23 PROCEDURE — 3078F DIAST BP <80 MM HG: CPT | Performed by: INTERNAL MEDICINE

## 2024-01-23 PROCEDURE — G8417 CALC BMI ABV UP PARAM F/U: HCPCS | Performed by: INTERNAL MEDICINE

## 2024-01-23 PROCEDURE — G8428 CUR MEDS NOT DOCUMENT: HCPCS | Performed by: INTERNAL MEDICINE

## 2024-01-23 PROCEDURE — 99211 OFF/OP EST MAY X REQ PHY/QHP: CPT | Performed by: INTERNAL MEDICINE

## 2024-01-23 PROCEDURE — 3074F SYST BP LT 130 MM HG: CPT | Performed by: INTERNAL MEDICINE

## 2024-01-23 RX ORDER — PHENTERMINE HYDROCHLORIDE 37.5 MG/1
37.5 TABLET ORAL DAILY
Qty: 30 TABLET | Refills: 0 | Status: SHIPPED | OUTPATIENT
Start: 2024-01-23 | End: 2024-02-22

## 2024-01-23 NOTE — PATIENT INSTRUCTIONS
Breakfast -     one high protein shake                            + 20 grams of fiber. Do this by either eating 12 tablespoons of the original, plain Fiber One cereal every day or 4 tablespoons of wheat dextrin powder (Benefiber or a generic brand) every day. Work up to this amount slowly by starting with only one-eighth to one-fourth of the target amount and then adding another one-eighth to one-fourth every one or two weeks until reaching the target.     Lunch -           one high protein shake                           + one a fat snack item     Dinner -          one frozen meal                           + one snack item     Shake options (<200 sailaja, >25 grams/protein) :  Nectar, Pure Protein, Premier, Fairlife, Boost Max, Ensure Max, BeneProtein and GNC lean (which is lactose-free) are milk-based options; Nectar, Premier Protein Clear, IsoPure Protein Drink, and Protein 2 O are water-based options; (Premier Protein Clear, the water-based option, comes in a 20 oz bottle with 20 grams of protein and 90 calories. So you have to drink three each day which increases the cost.)  (Disclaimer: Dietary supplements rarely have their listed ingredients and the amount of each verified by a third party other. Sometimes they give verification for their claims to be GMO and gluten free and to be organic. However, even such verifications as these may still be untrustworthy.)     Fat snack options (<150 sailaja, >11 grams of fat): 22 almonds or cashews, 1 1/2 tablespoon of a oil-based dressing or 4 tablespoons of Italian dressing on a bed of salad greens, 1 1/2 tablespoons of peanut butter, 1 Cranberry [place] Kind Bar     Snack options (<100 sailaja, no sweets): fruit, low fat/high protein Greek yogurt, mozzarella cheese stick, nuts, salad with dressing, peanut butter, chips/crackers/pretzels     Frozen meal options (<350 sailaja, <800 mg sodium):  Weight Watchers Smart Ones, Lean Cuisine, Healthy Choice, Carmleita's, Hillary's, etc     Food

## 2024-01-23 NOTE — PROGRESS NOTES
(Benefiber or a generic brand) every day. Work up to this amount slowly by starting with only one-eighth to one-fourth of the target amount and then adding another one-eighth to one-fourth every one or two weeks until reaching the target.     Lunch -           one high protein shake                           + one a fat snack item     Dinner -          one frozen meal                           + one snack item     Shake options (<200 sailaja, >25 grams/protein) :  Nectar, Pure Protein, Premier, Fairlife, Boost Max, Ensure Max, BeneProtein and GNC lean (which is lactose-free) are milk-based options; Nectar, Premier Protein Clear, IsoPure Protein Drink, and Protein 2 O are water-based options; (Premier Protein Clear, the water-based option, comes in a 20 oz bottle with 20 grams of protein and 90 calories. So you have to drink three each day which increases the cost.)  (Disclaimer: Dietary supplements rarely have their listed ingredients and the amount of each verified by a third party other. Sometimes they give verification for their claims to be GMO and gluten free and to be organic. However, even such verifications as these may still be untrustworthy.)     Fat snack options (<150 sailaja, >11 grams of fat): 22 almonds or cashews, 1 1/2 tablespoon of a oil-based dressing or 4 tablespoons of Italian dressing on a bed of salad greens, 1 1/2 tablespoons of peanut butter, 1 Cranberry [place] Kind Bar     Snack options (<100 sailaja, no sweets): fruit, low fat/high protein Greek yogurt, mozzarella cheese stick, nuts, salad with dressing, peanut butter, chips/crackers/pretzels     Frozen meal options (<350 sailaja, <800 mg sodium):  Weight Watchers Smart Ones, Lean Cuisine, Healthy Choice, Carmelita's, Hillary's, etc     Food substitutes for the shakes:  4 oz of baked, grilled or broiled chicken, turkey or fish, 8 egg whites    Drink at least 64 oz of water each day     Take a multivitamin daily     Walk 30 min every day    Phentermine:  Take

## 2024-02-27 ENCOUNTER — OFFICE VISIT (OUTPATIENT)
Dept: BARIATRICS/WEIGHT MGMT | Age: 44
End: 2024-02-27
Payer: COMMERCIAL

## 2024-02-27 VITALS
HEIGHT: 60 IN | TEMPERATURE: 98 F | HEART RATE: 96 BPM | WEIGHT: 157.2 LBS | DIASTOLIC BLOOD PRESSURE: 67 MMHG | BODY MASS INDEX: 30.86 KG/M2 | SYSTOLIC BLOOD PRESSURE: 117 MMHG

## 2024-02-27 DIAGNOSIS — E66.09 CLASS 1 OBESITY DUE TO EXCESS CALORIES WITHOUT SERIOUS COMORBIDITY WITH BODY MASS INDEX (BMI) OF 31.0 TO 31.9 IN ADULT: ICD-10-CM

## 2024-02-27 DIAGNOSIS — I10 ESSENTIAL HYPERTENSION: Primary | ICD-10-CM

## 2024-02-27 PROCEDURE — G8428 CUR MEDS NOT DOCUMENT: HCPCS | Performed by: INTERNAL MEDICINE

## 2024-02-27 PROCEDURE — G8417 CALC BMI ABV UP PARAM F/U: HCPCS | Performed by: INTERNAL MEDICINE

## 2024-02-27 PROCEDURE — 3078F DIAST BP <80 MM HG: CPT | Performed by: INTERNAL MEDICINE

## 2024-02-27 PROCEDURE — 3074F SYST BP LT 130 MM HG: CPT | Performed by: INTERNAL MEDICINE

## 2024-02-27 PROCEDURE — 1036F TOBACCO NON-USER: CPT | Performed by: INTERNAL MEDICINE

## 2024-02-27 PROCEDURE — 99211 OFF/OP EST MAY X REQ PHY/QHP: CPT

## 2024-02-27 PROCEDURE — G8484 FLU IMMUNIZE NO ADMIN: HCPCS | Performed by: INTERNAL MEDICINE

## 2024-02-27 PROCEDURE — 99214 OFFICE O/P EST MOD 30 MIN: CPT | Performed by: INTERNAL MEDICINE

## 2024-02-27 RX ORDER — PHENTERMINE HYDROCHLORIDE 37.5 MG/1
37.5 TABLET ORAL DAILY
Qty: 30 TABLET | Refills: 0 | Status: SHIPPED | OUTPATIENT
Start: 2024-02-27 | End: 2024-03-28

## 2024-02-27 NOTE — PATIENT INSTRUCTIONS
substitutes for the shakes:  4 oz of baked, grilled or broiled chicken, turkey or fish, 8 egg whites    Drink at least 64 oz of water each day     Take a multivitamin daily     Walk 30 min every day    Phentermine:  Take phentermine 37.5 mg, one-half to one tablet daily as needed for appetite suppression. Take each dose 30-90 min before effect will be needed.    While taking phentermine, check the Blood Pressure every morning and every evening.     If the systolic BP is >155 mmHg, the diastolic BP is >90 mm/Hg or the heart rate is > 100 beats per minute, do not take phentermine that day.    If the systolic BP is consistently >155 mmHg, the diastolic BP is consistently above 90 mm/Hg or the heart rate is consistently > 100 beats per minute, then stop taking phentermine altogether.    If the systolic BP >170 mmHg or the diastolic BP is >100 mmHg (even if it is only once), then phentermine should be stopped altogether without proving that any of these are consistently elevated.    Follow up in 1 month.

## 2024-02-27 NOTE — PROGRESS NOTES
continue.    Plan                 -   Breakfast -     one high protein shake                            + 20 grams of fiber. Do this by either eating 12 tablespoons of the original, plain Fiber One cereal every day or 4 tablespoons of wheat dextrin powder (Benefiber or a generic brand) every day. Work up to this amount slowly by starting with only one-eighth to one-fourth of the target amount and then adding another one-eighth to one-fourth every one or two weeks until reaching the target.     Lunch -           one high protein shake                           + one a fat snack item     Dinner -          one frozen meal                           + one snack item     Shake options (<200 sailaja, >25 grams/protein) :  Nectar, Pure Protein, Premier, Fairlife, Boost Max, Ensure Max, BeneProtein and GNC lean (which is lactose-free) are milk-based options; Nectar, Premier Protein Clear, IsoPure Protein Drink, and Protein 2 O are water-based options; (Premier Protein Clear, the water-based option, comes in a 20 oz bottle with 20 grams of protein and 90 calories. So you have to drink three each day which increases the cost.)  (Disclaimer: Dietary supplements rarely have their listed ingredients and the amount of each verified by a third party other. Sometimes they give verification for their claims to be GMO and gluten free and to be organic. However, even such verifications as these may still be untrustworthy.)     Fat snack options (<150 sailaja, >11 grams of fat): 22 almonds or cashews, 1 1/2 tablespoon of a oil-based dressing or 4 tablespoons of Italian dressing on a bed of salad greens, 1 1/2 tablespoons of peanut butter, 1 Cranberry [place] Kind Bar     Snack options (<100 sailaja, no sweets): fruit, low fat/high protein Greek yogurt, mozzarella cheese stick, nuts, salad with dressing, peanut butter, chips/crackers/pretzels     Frozen meal options (<350 sailaja, <800 mg sodium):  Weight Watchers Smart Ones, Lean Cuisine, Healthy

## 2024-04-02 ENCOUNTER — OFFICE VISIT (OUTPATIENT)
Dept: BARIATRICS/WEIGHT MGMT | Age: 44
End: 2024-04-02
Payer: COMMERCIAL

## 2024-04-02 VITALS
DIASTOLIC BLOOD PRESSURE: 70 MMHG | WEIGHT: 153.4 LBS | HEIGHT: 60 IN | BODY MASS INDEX: 30.12 KG/M2 | TEMPERATURE: 97.2 F | HEART RATE: 88 BPM | SYSTOLIC BLOOD PRESSURE: 119 MMHG

## 2024-04-02 DIAGNOSIS — I10 ESSENTIAL HYPERTENSION: Primary | ICD-10-CM

## 2024-04-02 DIAGNOSIS — E66.09 CLASS 1 OBESITY DUE TO EXCESS CALORIES WITH BODY MASS INDEX (BMI) OF 30.0 TO 30.9 IN ADULT, UNSPECIFIED WHETHER SERIOUS COMORBIDITY PRESENT: ICD-10-CM

## 2024-04-02 PROBLEM — J06.9 ACUTE UPPER RESPIRATORY INFECTION: Status: ACTIVE | Noted: 2024-04-02

## 2024-04-02 PROBLEM — E28.2 PCOS (POLYCYSTIC OVARIAN SYNDROME): Status: ACTIVE | Noted: 2023-10-26

## 2024-04-02 PROCEDURE — G8417 CALC BMI ABV UP PARAM F/U: HCPCS | Performed by: CLINICAL NURSE SPECIALIST

## 2024-04-02 PROCEDURE — 99211 OFF/OP EST MAY X REQ PHY/QHP: CPT

## 2024-04-02 PROCEDURE — 3074F SYST BP LT 130 MM HG: CPT | Performed by: CLINICAL NURSE SPECIALIST

## 2024-04-02 PROCEDURE — 99213 OFFICE O/P EST LOW 20 MIN: CPT | Performed by: CLINICAL NURSE SPECIALIST

## 2024-04-02 PROCEDURE — G8427 DOCREV CUR MEDS BY ELIG CLIN: HCPCS | Performed by: CLINICAL NURSE SPECIALIST

## 2024-04-02 PROCEDURE — 3078F DIAST BP <80 MM HG: CPT | Performed by: CLINICAL NURSE SPECIALIST

## 2024-04-02 PROCEDURE — 1036F TOBACCO NON-USER: CPT | Performed by: CLINICAL NURSE SPECIALIST

## 2024-04-02 RX ORDER — PHENTERMINE HYDROCHLORIDE 37.5 MG/1
37.5 TABLET ORAL DAILY
Qty: 30 TABLET | Refills: 0 | Status: SHIPPED | OUTPATIENT
Start: 2024-04-02 | End: 2024-05-02

## 2024-04-02 ASSESSMENT — ENCOUNTER SYMPTOMS
CONSTIPATION: 1
SHORTNESS OF BREATH: 0
VOMITING: 0

## 2024-04-02 NOTE — PROGRESS NOTES
#2                          176.0               12/14/22 Phentermine #3                          167.8               1/19/23   Contrave - did not work - taper                          168.0               3/6/23                          166.4               4/17/23   Qsymia - made her nauseous -> Phentermine                          166.6               5/16/23   Phentermine contd.                          162.0               6/19/23   Phentermine continued                          161.6               7/19/23   Wellbutrin - stopped after running                          171.0               11/27/23  Phentermine #1     164.4 lbs  12/22/23  Phentemine    164.6  1/23/24     Phentermine#3    157.2  2/27/24     Phentermine    153.4  4/2/24      Phentermine    Total weight change to date: -30.8 lbs.   Average daily energy variance:  10/16/2022 - 11/16/2022: -6.6 lbs (72294 Micheal)/30 d = -770 Micheal/d deficit.  11/16/2022 - 12/14/2022: +0.0 lbs (0 Micheal)/28 d = +0 Micheal/d excess.  12/14/2022 - 1/19/2023: -8.2 lbs (88863 Micheal)/36 d = -797 Micheal/d deficit.  3/6/2023 - 4/17/2023: -1.6 lbs (5600 Micheal)/42 d = -133 Micheal/d deficit.  4/17/2023 - 5/16/2023: +0.2 lbs (700 Micheal)/29 d = +24 Micheal/d excess.  5/16/2023 - 6/19/2023: -4.6 lbs (23544 Micheal)/34 d = -474 Micheal/d deficit.  6/19/2023 - 7/19/2023: -0.4 lbs (1400 Micheal)/30 d = -47 Micheal/d deficit.  7/19/2023 - 11/27/2023: +9.4 lbs (54907 Micheal)/131 d = +251 Micheal/d excess.    11/27/2023 - 12/22/2023: - 6.6 lbs (23,100 Micheal)/25 d = - 924 Micheal/d deficit    12/22/2023 - 1/23/2024: +0.2 lbs (700 Micheal)/32 d = +22 Micheal/d excess.  1/23/2024 - 2/27/2024: -7.4 lbs (42349 Micheal)/35 d = -740 Micheal/d deficit.   2/27/2024 - 4/1/2024: -3.8 lbs (90641 Micheal)/34 d = -392 Micheal/d deficit     11/27/23 - 2/27/24 : 171 - 157.2 = -8.1% change in body wt in about 3 months.    Patient's estimated daily energy need (DEN) = 1776 Micheal/d = 18039 Micheal/wk  Wt effect of HR foods = 2100 Micheal/wk = 300 Micheal/d= 17% DEN = 31 lb/year.     Update:  Diet plan

## 2024-05-08 ENCOUNTER — OFFICE VISIT (OUTPATIENT)
Dept: BARIATRICS/WEIGHT MGMT | Age: 44
End: 2024-05-08
Payer: COMMERCIAL

## 2024-05-08 VITALS
SYSTOLIC BLOOD PRESSURE: 121 MMHG | HEIGHT: 60 IN | DIASTOLIC BLOOD PRESSURE: 72 MMHG | BODY MASS INDEX: 29.29 KG/M2 | WEIGHT: 149.2 LBS | HEART RATE: 95 BPM | TEMPERATURE: 98.2 F

## 2024-05-08 DIAGNOSIS — E66.3 OVERWEIGHT (BMI 25.0-29.9): ICD-10-CM

## 2024-05-08 DIAGNOSIS — I10 ESSENTIAL HYPERTENSION: Primary | ICD-10-CM

## 2024-05-08 PROCEDURE — 1036F TOBACCO NON-USER: CPT | Performed by: INTERNAL MEDICINE

## 2024-05-08 PROCEDURE — G8417 CALC BMI ABV UP PARAM F/U: HCPCS | Performed by: INTERNAL MEDICINE

## 2024-05-08 PROCEDURE — 99214 OFFICE O/P EST MOD 30 MIN: CPT | Performed by: INTERNAL MEDICINE

## 2024-05-08 PROCEDURE — 3078F DIAST BP <80 MM HG: CPT | Performed by: INTERNAL MEDICINE

## 2024-05-08 PROCEDURE — 3074F SYST BP LT 130 MM HG: CPT | Performed by: INTERNAL MEDICINE

## 2024-05-08 PROCEDURE — G8428 CUR MEDS NOT DOCUMENT: HCPCS | Performed by: INTERNAL MEDICINE

## 2024-05-08 PROCEDURE — 99211 OFF/OP EST MAY X REQ PHY/QHP: CPT

## 2024-05-08 RX ORDER — PHENTERMINE HYDROCHLORIDE 37.5 MG/1
37.5 TABLET ORAL DAILY
Qty: 30 TABLET | Refills: 0 | Status: SHIPPED | OUTPATIENT
Start: 2024-05-08 | End: 2024-06-07

## 2024-05-08 NOTE — PATIENT INSTRUCTIONS
Breakfast -     one high protein shake                            + 20 grams of fiber. Do this by either eating 12 tablespoons of the original, plain Fiber One cereal every day or 4 tablespoons of wheat dextrin powder (Benefiber or a generic brand) every day. Work up to this amount slowly by starting with only one-eighth to one-fourth of the target amount and then adding another one-eighth to one-fourth every one or two weeks until reaching the target.     Lunch -           one high protein shake                           + one a fat snack item     Dinner -          one frozen meal                           + one snack item     Shake options (<200 sailaja, >25 grams/protein) :  Nectar, Pure Protein, Premier, Fairlife, Boost Max, Ensure Max, BeneProtein and GNC lean (which is lactose-free) are milk-based options; Nectar, Premier Protein Clear, IsoPure Protein Drink, and Protein 2 O are water-based options; (Premier Protein Clear, the water-based option, comes in a 20 oz bottle with 20 grams of protein and 90 calories. So you have to drink three each day which increases the cost.)  (Disclaimer: Dietary supplements rarely have their listed ingredients and the amount of each verified by a third party other. Sometimes they give verification for their claims to be GMO and gluten free and to be organic. However, even such verifications as these may still be untrustworthy.)     Fat snack options (<150 sailaja, >11 grams of fat): 22 almonds or cashews, 1 1/2 tablespoon of a oil-based dressing or 4 tablespoons of Italian dressing on a bed of salad greens, 1 1/2 tablespoons of peanut butter, 1 Cranberry [place] Kind Bar     Snack options (<100 sailaja, no sweets): fruit, low fat/high protein Greek yogurt, mozzarella cheese stick, nuts, salad with dressing, peanut butter, chips/crackers/pretzels     Frozen meal options (<350 sailaja, <800 mg sodium):  Weight Watchers Smart Ones, Lean Cuisine, Healthy Choice, Carmelita's, Hillary's, etc     Food

## 2024-05-08 NOTE — PROGRESS NOTES
CC -   HTN, Obesity     BACKGROUND -   Last visit: 4/2/24  First visit: 10/12/22     Obesity (all weight in lbs)  Began 4-5 yrs ago  Initial Ht 59.5, Wt 184.2,  BMI 36.58  HS Grad wt 120   Lowest   wt 120   Highest  wt 187 (7-8 yrs ago)  Pattern of wt gain: gradual  Wt change past yr: +7-8 lbs  Most wt lost: 15 lbs   Other diets attempted: WW, exercise     Desire to lose weight: 10/10 (no surgery)     Initial Diet:            Number of meals per day       - 3            Number of snacks per day      - 2            Meal volume                           - 12\" plate,  rarely seconds            Fast food/convenience store  - 7x/week            Restaurants (not fast food)     - 0-1x/week           Sweets                                    - 5d/week           Chips                                      - 0d/week           Crackers/pretzels                    - 2d/week (pretzels)           Nuts                                        - 0-1d/week           Peanut Butter                          - 1-2d/week           Popcorn                                  - 0-1d/week           Dried fruit                                - 0d/week           Whole fruit                              - 7d/week (2-3 servings/day)           Breakfast cereal                      - 0d/week           [place]/Protein/Energy bar    - 0d/week           Sugar sweetened beverages - DD refresher 2-3x/wk (130 Micheal), no pop/soda, orange juice ~4 oz 2-3x/wk, no coffee, no energy drink           Protein                                    - Naked pea protein powder 1 scoop 5x/d in orange juice+water+fruit+kale           Fiber                                       - No supplements          Initial Exercise:            Gym membership    - [facility] - 1x/wk for wateraerobics Sat 1 hr            Walking                    - 3x/wk - 1.5-2 miles            Running                   - no            Resistance               - no            Aerobic class           - no

## 2024-08-25 ENCOUNTER — APPOINTMENT (OUTPATIENT)
Dept: ULTRASOUND IMAGING | Age: 44
DRG: 744 | End: 2024-08-25
Payer: COMMERCIAL

## 2024-08-25 ENCOUNTER — HOSPITAL ENCOUNTER (INPATIENT)
Age: 44
LOS: 2 days | Discharge: HOME | DRG: 744 | End: 2024-08-27
Attending: EMERGENCY MEDICINE | Admitting: STUDENT IN AN ORGANIZED HEALTH CARE EDUCATION/TRAINING PROGRAM
Payer: COMMERCIAL

## 2024-08-25 ENCOUNTER — APPOINTMENT (OUTPATIENT)
Dept: CT IMAGING | Age: 44
DRG: 744 | End: 2024-08-25
Payer: COMMERCIAL

## 2024-08-25 DIAGNOSIS — D64.9 ANEMIA, UNSPECIFIED TYPE: Primary | ICD-10-CM

## 2024-08-25 DIAGNOSIS — R19.7 NAUSEA, VOMITING, AND DIARRHEA: ICD-10-CM

## 2024-08-25 DIAGNOSIS — N93.9 ABNORMAL UTERINE BLEEDING: ICD-10-CM

## 2024-08-25 DIAGNOSIS — N85.2 ENLARGED UTERUS: ICD-10-CM

## 2024-08-25 DIAGNOSIS — D25.9 UTERINE LEIOMYOMA, UNSPECIFIED LOCATION: ICD-10-CM

## 2024-08-25 DIAGNOSIS — R10.30 LOWER ABDOMINAL PAIN: ICD-10-CM

## 2024-08-25 DIAGNOSIS — R11.2 NAUSEA, VOMITING, AND DIARRHEA: ICD-10-CM

## 2024-08-25 LAB
ALBUMIN SERPL-MCNC: 4.4 G/DL (ref 3.5–5.2)
ALP SERPL-CCNC: 80 U/L (ref 35–104)
ALT SERPL-CCNC: <5 U/L (ref 0–32)
ANION GAP SERPL CALCULATED.3IONS-SCNC: 9 MMOL/L (ref 7–16)
AST SERPL-CCNC: 10 U/L (ref 0–31)
BACTERIA URNS QL MICRO: ABNORMAL
BASOPHILS # BLD: 0.06 K/UL (ref 0–0.2)
BASOPHILS NFR BLD: 1 % (ref 0–2)
BILIRUB SERPL-MCNC: <0.2 MG/DL (ref 0–1.2)
BILIRUB UR QL STRIP: NEGATIVE
BUN SERPL-MCNC: 8 MG/DL (ref 6–20)
CALCIUM SERPL-MCNC: 9.4 MG/DL (ref 8.6–10.2)
CHLORIDE SERPL-SCNC: 105 MMOL/L (ref 98–107)
CLARITY UR: ABNORMAL
CO2 SERPL-SCNC: 25 MMOL/L (ref 22–29)
COLOR UR: YELLOW
CREAT SERPL-MCNC: 0.6 MG/DL (ref 0.5–1)
EOSINOPHIL # BLD: 0.09 K/UL (ref 0.05–0.5)
EOSINOPHILS RELATIVE PERCENT: 1 % (ref 0–6)
EPI CELLS #/AREA URNS HPF: ABNORMAL /HPF
ERYTHROCYTE [DISTWIDTH] IN BLOOD BY AUTOMATED COUNT: 24.8 % (ref 11.5–15)
FERRITIN SERPL-MCNC: 9 NG/ML
GFR, ESTIMATED: >90 ML/MIN/1.73M2
GLUCOSE SERPL-MCNC: 100 MG/DL (ref 74–99)
GLUCOSE UR STRIP-MCNC: NEGATIVE MG/DL
HCG, URINE, POC: NEGATIVE
HCT VFR BLD AUTO: 19.3 % (ref 34–48)
HCT VFR BLD AUTO: 22 % (ref 34–48)
HGB BLD-MCNC: 4.8 G/DL (ref 11.5–15.5)
HGB BLD-MCNC: 5.4 G/DL (ref 11.5–15.5)
HGB UR QL STRIP.AUTO: ABNORMAL
IMM GRANULOCYTES # BLD AUTO: 0.03 K/UL (ref 0–0.58)
IMM GRANULOCYTES NFR BLD: 0 % (ref 0–5)
IRON SATN MFR SERPL: 2 % (ref 15–50)
IRON SERPL-MCNC: 9 UG/DL (ref 37–145)
KETONES UR STRIP-MCNC: NEGATIVE MG/DL
LACTATE BLDV-SCNC: 0.9 MMOL/L (ref 0.5–2.2)
LEUKOCYTE ESTERASE UR QL STRIP: ABNORMAL
LIPASE SERPL-CCNC: 32 U/L (ref 13–60)
LYMPHOCYTES NFR BLD: 1.84 K/UL (ref 1.5–4)
LYMPHOCYTES RELATIVE PERCENT: 25 % (ref 20–42)
Lab: NORMAL
MAGNESIUM SERPL-MCNC: 2.2 MG/DL (ref 1.6–2.6)
MCH RBC QN AUTO: 15.3 PG (ref 26–35)
MCHC RBC AUTO-ENTMCNC: 24.5 G/DL (ref 32–34.5)
MCV RBC AUTO: 62.5 FL (ref 80–99.9)
MONOCYTES NFR BLD: 0.49 K/UL (ref 0.1–0.95)
MONOCYTES NFR BLD: 7 % (ref 2–12)
NEGATIVE QC PASS/FAIL: NORMAL
NEUTROPHILS NFR BLD: 66 % (ref 43–80)
NEUTS SEG NFR BLD: 4.79 K/UL (ref 1.8–7.3)
NITRITE UR QL STRIP: NEGATIVE
PH UR STRIP: 7 [PH] (ref 5–9)
PLATELET, FLUORESCENCE: 635 K/UL (ref 130–450)
PMV BLD AUTO: 10.1 FL (ref 7–12)
POSITIVE QC PASS/FAIL: NORMAL
POTASSIUM SERPL-SCNC: 3.4 MMOL/L (ref 3.5–5)
PROT SERPL-MCNC: 7.5 G/DL (ref 6.4–8.3)
PROT UR STRIP-MCNC: 100 MG/DL
RBC # BLD AUTO: 3.52 M/UL (ref 3.5–5.5)
RBC # BLD: ABNORMAL 10*6/UL
RBC #/AREA URNS HPF: ABNORMAL /HPF
SODIUM SERPL-SCNC: 139 MMOL/L (ref 132–146)
SP GR UR STRIP: 1.01 (ref 1–1.03)
TIBC SERPL-MCNC: 530 UG/DL (ref 250–450)
UROBILINOGEN UR STRIP-ACNC: 0.2 EU/DL (ref 0–1)
WBC #/AREA URNS HPF: ABNORMAL /HPF
WBC OTHER # BLD: 7.3 K/UL (ref 4.5–11.5)

## 2024-08-25 PROCEDURE — G0378 HOSPITAL OBSERVATION PER HR: HCPCS

## 2024-08-25 PROCEDURE — 80053 COMPREHEN METABOLIC PANEL: CPT

## 2024-08-25 PROCEDURE — 96367 TX/PROPH/DG ADDL SEQ IV INF: CPT

## 2024-08-25 PROCEDURE — 99222 1ST HOSP IP/OBS MODERATE 55: CPT | Performed by: STUDENT IN AN ORGANIZED HEALTH CARE EDUCATION/TRAINING PROGRAM

## 2024-08-25 PROCEDURE — 86850 RBC ANTIBODY SCREEN: CPT

## 2024-08-25 PROCEDURE — 2500000003 HC RX 250 WO HCPCS: Performed by: PHYSICIAN ASSISTANT

## 2024-08-25 PROCEDURE — 83735 ASSAY OF MAGNESIUM: CPT

## 2024-08-25 PROCEDURE — 30233N1 TRANSFUSION OF NONAUTOLOGOUS RED BLOOD CELLS INTO PERIPHERAL VEIN, PERCUTANEOUS APPROACH: ICD-10-PCS | Performed by: INTERNAL MEDICINE

## 2024-08-25 PROCEDURE — 6360000002 HC RX W HCPCS: Performed by: PHYSICIAN ASSISTANT

## 2024-08-25 PROCEDURE — 6360000004 HC RX CONTRAST MEDICATION: Performed by: RADIOLOGY

## 2024-08-25 PROCEDURE — 2500000003 HC RX 250 WO HCPCS: Performed by: OBSTETRICS & GYNECOLOGY

## 2024-08-25 PROCEDURE — 36430 TRANSFUSION BLD/BLD COMPNT: CPT

## 2024-08-25 PROCEDURE — 94640 AIRWAY INHALATION TREATMENT: CPT

## 2024-08-25 PROCEDURE — 86900 BLOOD TYPING SEROLOGIC ABO: CPT

## 2024-08-25 PROCEDURE — 82728 ASSAY OF FERRITIN: CPT

## 2024-08-25 PROCEDURE — 85018 HEMOGLOBIN: CPT

## 2024-08-25 PROCEDURE — 6370000000 HC RX 637 (ALT 250 FOR IP): Performed by: STUDENT IN AN ORGANIZED HEALTH CARE EDUCATION/TRAINING PROGRAM

## 2024-08-25 PROCEDURE — 86901 BLOOD TYPING SEROLOGIC RH(D): CPT

## 2024-08-25 PROCEDURE — 6360000002 HC RX W HCPCS: Performed by: STUDENT IN AN ORGANIZED HEALTH CARE EDUCATION/TRAINING PROGRAM

## 2024-08-25 PROCEDURE — 2580000003 HC RX 258: Performed by: STUDENT IN AN ORGANIZED HEALTH CARE EDUCATION/TRAINING PROGRAM

## 2024-08-25 PROCEDURE — 86923 COMPATIBILITY TEST ELECTRIC: CPT

## 2024-08-25 PROCEDURE — P9016 RBC LEUKOCYTES REDUCED: HCPCS

## 2024-08-25 PROCEDURE — 83540 ASSAY OF IRON: CPT

## 2024-08-25 PROCEDURE — 2060000000 HC ICU INTERMEDIATE R&B

## 2024-08-25 PROCEDURE — 2580000003 HC RX 258: Performed by: PHYSICIAN ASSISTANT

## 2024-08-25 PROCEDURE — 85025 COMPLETE CBC W/AUTO DIFF WBC: CPT

## 2024-08-25 PROCEDURE — 83690 ASSAY OF LIPASE: CPT

## 2024-08-25 PROCEDURE — 81001 URINALYSIS AUTO W/SCOPE: CPT

## 2024-08-25 PROCEDURE — 74177 CT ABD & PELVIS W/CONTRAST: CPT

## 2024-08-25 PROCEDURE — 76856 US EXAM PELVIC COMPLETE: CPT

## 2024-08-25 PROCEDURE — 85014 HEMATOCRIT: CPT

## 2024-08-25 PROCEDURE — 83550 IRON BINDING TEST: CPT

## 2024-08-25 PROCEDURE — 83605 ASSAY OF LACTIC ACID: CPT

## 2024-08-25 PROCEDURE — 96365 THER/PROPH/DIAG IV INF INIT: CPT

## 2024-08-25 PROCEDURE — 93005 ELECTROCARDIOGRAM TRACING: CPT | Performed by: PHYSICIAN ASSISTANT

## 2024-08-25 PROCEDURE — 96375 TX/PRO/DX INJ NEW DRUG ADDON: CPT

## 2024-08-25 PROCEDURE — 99285 EMERGENCY DEPT VISIT HI MDM: CPT

## 2024-08-25 RX ORDER — KETOROLAC TROMETHAMINE 30 MG/ML
30 INJECTION, SOLUTION INTRAMUSCULAR; INTRAVENOUS ONCE
Status: COMPLETED | OUTPATIENT
Start: 2024-08-25 | End: 2024-08-25

## 2024-08-25 RX ORDER — BUDESONIDE 0.5 MG/2ML
0.5 INHALANT ORAL
Status: DISCONTINUED | OUTPATIENT
Start: 2024-08-25 | End: 2024-08-27 | Stop reason: HOSPADM

## 2024-08-25 RX ORDER — FERROUS SULFATE 325(65) MG
325 TABLET ORAL DAILY
Status: DISCONTINUED | OUTPATIENT
Start: 2024-08-26 | End: 2024-08-27 | Stop reason: HOSPADM

## 2024-08-25 RX ORDER — ONDANSETRON 2 MG/ML
4 INJECTION INTRAMUSCULAR; INTRAVENOUS ONCE
Status: COMPLETED | OUTPATIENT
Start: 2024-08-25 | End: 2024-08-25

## 2024-08-25 RX ORDER — CETIRIZINE HYDROCHLORIDE 10 MG/1
10 TABLET ORAL DAILY
Status: DISCONTINUED | OUTPATIENT
Start: 2024-08-25 | End: 2024-08-27 | Stop reason: HOSPADM

## 2024-08-25 RX ORDER — 0.9 % SODIUM CHLORIDE 0.9 %
1000 INTRAVENOUS SOLUTION INTRAVENOUS ONCE
Status: COMPLETED | OUTPATIENT
Start: 2024-08-25 | End: 2024-08-25

## 2024-08-25 RX ORDER — LISINOPRIL 20 MG/1
20 TABLET ORAL DAILY
Status: DISCONTINUED | OUTPATIENT
Start: 2024-08-25 | End: 2024-08-27 | Stop reason: HOSPADM

## 2024-08-25 RX ORDER — PANTOPRAZOLE SODIUM 40 MG/1
40 TABLET, DELAYED RELEASE ORAL
Status: DISCONTINUED | OUTPATIENT
Start: 2024-08-25 | End: 2024-08-27 | Stop reason: HOSPADM

## 2024-08-25 RX ORDER — TRANEXAMIC ACID 10 MG/ML
1000 INJECTION, SOLUTION INTRAVENOUS ONCE
Status: COMPLETED | OUTPATIENT
Start: 2024-08-25 | End: 2024-08-25

## 2024-08-25 RX ORDER — VITAMIN B COMPLEX
2000 TABLET ORAL DAILY
Status: DISCONTINUED | OUTPATIENT
Start: 2024-08-25 | End: 2024-08-27 | Stop reason: HOSPADM

## 2024-08-25 RX ORDER — ALBUTEROL SULFATE 0.83 MG/ML
2.5 SOLUTION RESPIRATORY (INHALATION) EVERY 4 HOURS PRN
Status: DISCONTINUED | OUTPATIENT
Start: 2024-08-25 | End: 2024-08-27 | Stop reason: HOSPADM

## 2024-08-25 RX ORDER — MONTELUKAST SODIUM 10 MG/1
10 TABLET ORAL NIGHTLY
Status: DISCONTINUED | OUTPATIENT
Start: 2024-08-25 | End: 2024-08-27 | Stop reason: HOSPADM

## 2024-08-25 RX ORDER — AMLODIPINE BESYLATE 5 MG/1
5 TABLET ORAL DAILY
Status: DISCONTINUED | OUTPATIENT
Start: 2024-08-25 | End: 2024-08-27 | Stop reason: HOSPADM

## 2024-08-25 RX ORDER — OMEGA-3-ACID ETHYL ESTERS 1 G/1
2 CAPSULE, LIQUID FILLED ORAL 2 TIMES DAILY
Status: DISCONTINUED | OUTPATIENT
Start: 2024-08-25 | End: 2024-08-27 | Stop reason: HOSPADM

## 2024-08-25 RX ORDER — ALBUTEROL SULFATE 90 UG/1
2 AEROSOL, METERED RESPIRATORY (INHALATION) EVERY 4 HOURS PRN
Status: DISCONTINUED | OUTPATIENT
Start: 2024-08-25 | End: 2024-08-25 | Stop reason: CLARIF

## 2024-08-25 RX ORDER — IOPAMIDOL 755 MG/ML
75 INJECTION, SOLUTION INTRAVASCULAR
Status: COMPLETED | OUTPATIENT
Start: 2024-08-25 | End: 2024-08-25

## 2024-08-25 RX ORDER — SODIUM CHLORIDE 9 MG/ML
INJECTION, SOLUTION INTRAVENOUS PRN
Status: DISCONTINUED | OUTPATIENT
Start: 2024-08-25 | End: 2024-08-27 | Stop reason: HOSPADM

## 2024-08-25 RX ADMIN — AMLODIPINE BESYLATE 5 MG: 5 TABLET ORAL at 18:43

## 2024-08-25 RX ADMIN — FAMOTIDINE 20 MG: 10 INJECTION, SOLUTION INTRAVENOUS at 11:54

## 2024-08-25 RX ADMIN — WATER 1000 MG: 1 INJECTION INTRAMUSCULAR; INTRAVENOUS; SUBCUTANEOUS at 15:52

## 2024-08-25 RX ADMIN — LISINOPRIL 20 MG: 20 TABLET ORAL at 18:43

## 2024-08-25 RX ADMIN — IOPAMIDOL 75 ML: 755 INJECTION, SOLUTION INTRAVENOUS at 14:12

## 2024-08-25 RX ADMIN — Medication 2000 UNITS: at 18:43

## 2024-08-25 RX ADMIN — CETIRIZINE HYDROCHLORIDE 10 MG: 10 TABLET, FILM COATED ORAL at 18:43

## 2024-08-25 RX ADMIN — ONDANSETRON 4 MG: 2 INJECTION INTRAMUSCULAR; INTRAVENOUS at 11:59

## 2024-08-25 RX ADMIN — SODIUM CHLORIDE 1000 ML: 9 INJECTION, SOLUTION INTRAVENOUS at 12:02

## 2024-08-25 RX ADMIN — BUDESONIDE 500 MCG: 0.5 SUSPENSION RESPIRATORY (INHALATION) at 20:17

## 2024-08-25 RX ADMIN — KETOROLAC TROMETHAMINE 30 MG: 30 INJECTION, SOLUTION INTRAMUSCULAR at 12:00

## 2024-08-25 RX ADMIN — PANTOPRAZOLE SODIUM 40 MG: 40 TABLET, DELAYED RELEASE ORAL at 18:43

## 2024-08-25 RX ADMIN — SODIUM CHLORIDE 200 MG: 9 INJECTION, SOLUTION INTRAVENOUS at 15:57

## 2024-08-25 RX ADMIN — TRANEXAMIC ACID 1000 MG: 10 INJECTION, SOLUTION INTRAVENOUS at 20:25

## 2024-08-25 ASSESSMENT — LIFESTYLE VARIABLES
HOW MANY STANDARD DRINKS CONTAINING ALCOHOL DO YOU HAVE ON A TYPICAL DAY: PATIENT DOES NOT DRINK
HOW OFTEN DO YOU HAVE A DRINK CONTAINING ALCOHOL: NEVER

## 2024-08-25 ASSESSMENT — PAIN DESCRIPTION - ORIENTATION: ORIENTATION: RIGHT;LEFT;LOWER

## 2024-08-25 ASSESSMENT — PAIN - FUNCTIONAL ASSESSMENT: PAIN_FUNCTIONAL_ASSESSMENT: NONE - DENIES PAIN

## 2024-08-25 ASSESSMENT — PAIN SCALES - GENERAL
PAINLEVEL_OUTOF10: 6
PAINLEVEL_OUTOF10: 0

## 2024-08-25 ASSESSMENT — PAIN DESCRIPTION - DESCRIPTORS: DESCRIPTORS: CRAMPING

## 2024-08-25 ASSESSMENT — PAIN DESCRIPTION - LOCATION: LOCATION: ABDOMEN

## 2024-08-25 NOTE — PROGRESS NOTES
Dr. Irving on call for Dr. Dickson - call placed, voicemail message left on answering service line

## 2024-08-25 NOTE — H&P
Urinalysis    Collection Time: 08/25/24 11:43 AM   Result Value Ref Range    Color, UA Yellow Yellow    Turbidity UA SLIGHTLY CLOUDY (A) Clear    Glucose, Ur NEGATIVE NEGATIVE mg/dL    Bilirubin, Urine NEGATIVE NEGATIVE    Ketones, Urine NEGATIVE NEGATIVE mg/dL    Specific Gravity, UA 1.010 1.005 - 1.030    Urine Hgb LARGE (A) NEGATIVE    pH, Urine 7.0 5.0 - 9.0    Protein,  (A) NEGATIVE mg/dL    Urobilinogen, Urine 0.2 0.0 - 1.0 EU/dL    Nitrite, Urine NEGATIVE NEGATIVE    Leukocyte Esterase, Urine TRACE (A) NEGATIVE   Microscopic Urinalysis    Collection Time: 08/25/24 11:43 AM   Result Value Ref Range    WBC, UA 6 TO 9 (A) 0 TO 5 /HPF    RBC, UA 21 TO 50 (A) 0 TO 2 /HPF    Epithelial Cells, UA 0 TO 2 /HPF    Bacteria, UA 1+ (A) None   Magnesium    Collection Time: 08/25/24 11:43 AM   Result Value Ref Range    Magnesium 2.2 1.6 - 2.6 mg/dL   Hemoglobin and Hematocrit    Collection Time: 08/25/24  1:10 PM   Result Value Ref Range    Hemoglobin 4.8 (LL) 11.5 - 15.5 g/dL    Hematocrit 19.3 (L) 34.0 - 48.0 %   EKG 12 Lead    Collection Time: 08/25/24  1:21 PM   Result Value Ref Range    Ventricular Rate 82 BPM    Atrial Rate 82 BPM    P-R Interval 140 ms    QRS Duration 84 ms    Q-T Interval 378 ms    QTc Calculation (Bazett) 441 ms    P Axis 31 degrees    R Axis 29 degrees    T Axis 21 degrees   TYPE AND SCREEN    Collection Time: 08/25/24  1:35 PM   Result Value Ref Range    Blood Bank Sample Expiration 08/28/2024,2359     Arm Band Number [id number]     ABO/Rh A POSITIVE     Antibody Screen NEGATIVE     Unit Number [id number]     Component Leukocyte Reduced Red Cell     Unit Divison 00     Dispense Status Blood Bank ALLOCATED     Transfusion Status OK TO TRANSFUSE     Crossmatch Result COMPATIBLE        Imaging/Diagnostics:  CT ABDOMEN PELVIS W IV CONTRAST Additional Contrast? None    Result Date: 8/25/2024  1. Enlarged heterogeneous uterus with findings consistent with myomatous changes some of which may  represent degeneration and pedunculated shin rightward extending into the abdomen up to level of the umbilicus of enlargement and edema with an apparent 4.9 cm submucosal fibroid may be pedunculated along with a intramural left lateral 7.5 x 6.8 x 6.7 cm fibroid with potential degeneration given hyperdense signal and contents extending to the lower uterine segment with fluid-filled components throughout and within the cervix. Malignancy would be difficult to exclude such as leiomyosarcoma. Consider tissue sampling and/or MRI pelvis for further evaluation. 2. Enlarged aortocaval 1 cm lymph node increased from prior comparison 3. Mild bilateral hydronephrosis may be from mass effect of the uterus. 4. Mild hepatic steatosis.       Assessment :          Plan:     Patient status inpatient in the Med/Surge    Microcytic anemia-MCV 62.5.  Pending iron panel.  Transfuse under 7.  Patient to receive PRBC in the emergency department.  Pending repeat H&H.  Will give IV Venofer.  Heme/on consultation.  OB/GYN consultation for uterine fibroids.  Monitor labs daily.  Heterogeneous uterus findings-7.5 cm fibroid illustrated on CAT scan.  OB/GYN consultation..  History PCOS-continue home medications.  OB/GYN consulted.  Essential hypertension-continue Norvasc and lisinopril.  Acute cystitis-continue IV Rocephin.  Pending cultures.  Hypokalemia-replace and recheck.    Greater than 55 minutes spent in physical exam, chart review, assessment and plan.    Consultations:   IP CONSULT TO GYNECOLOGIC ONCOLOGY  IP CONSULT TO HEM/ONC     Patient is admitted as inpatient status because of co-morbidities listed above, severity of signs and symptoms as outlined, requirement for current medical therapies and most importantly because of direct risk to patient if care not provided in a hospital setting.  Expected length of stay > 48 hours.    Bob Dvais MD  8/25/2024  2:54 PM    Copy sent to Dr. Vences, Neris Conner, APRN - CNP

## 2024-08-25 NOTE — PROGRESS NOTES
4 Eyes Skin Assessment     NAME:  Nicolle Hernandez  YOB: 1980  MEDICAL RECORD NUMBER:  [id number]    The patient is being assessed for  Admission    I agree that at least one RN has performed a thorough Head to Toe Skin Assessment on the patient. ALL assessment sites listed below have been assessed.      Areas assessed by both nurses:    Head, Face, Ears, Shoulders, Back, Chest, Arms, Elbows, Hands, Sacrum. Buttock, Coccyx, Ischium, Legs. Feet and Heels, and Under Medical Devices         Does the Patient have a Wound? No noted wound(s)       Ritesh Prevention initiated by RN: No  Wound Care Orders initiated by RN: No    Pressure Injury (Stage 3,4, Unstageable, DTI, NWPT, and Complex wounds) if present, place Wound referral order by RN under : No    New Ostomies, if present place, Ostomy referral order under : No     Nurse 1 eSignature: Electronically signed by Maliha Montenegro RN on 8/25/24 at 6:32 PM EDT    **SHARE this note so that the co-signing nurse can place an eSignature**    Nurse 2 eSignature: Electronically signed by Merari Gallardo RN on 8/25/24 at 6:33 PM EDT

## 2024-08-25 NOTE — PROGRESS NOTES
Call placed to Dr. Rome for admission diet order. Also notified of 3.4 potassium, not replaced in ED.

## 2024-08-25 NOTE — PROGRESS NOTES
Callback received from Dr. Irving.     Verbal order for 1 more unit of PRBC to transfuse (2 total units),   1G TXA IV,   NPO after midnight.     Will see patient in morning - planning for D & C tomorrow. Patient notified and updated with plan of care

## 2024-08-25 NOTE — ED NOTES
.ED to Inpatient Handoff Report    Notified Maliha that electronic handoff available and patient ready for transport to room 641.    Safety Risks: None identified    Patient in Restraints: no    Constant Observer or Patient : no    Telemetry Monitoring Ordered :Yes           Order to transfer to unit without monitor:YES    Last MEWS: 1 Time completed: 1710    Deterioration Index Score:   Predictive Model Details          20 (Normal)  Factor Value    Calculated 8/25/2024 17:34 37% Age 43 years old    Deterioration Index Model 31% Respiratory rate 20     18% Hematocrit abnormal (19.3 %)     5% Systolic 124     5% Potassium abnormal (3.4 mmol/L)     3% Pulse oximetry 99 %     1% Pulse 84     0% WBC count 7.3 k/uL     0% Sodium 139 mmol/L     0% Temperature 98.3 °F (36.8 °C)        Vitals:    08/25/24 1710 08/25/24 1713 08/25/24 1723 08/25/24 1733   BP: 135/75 135/75 124/76    Pulse: 85 84 88 84   Resp: 20 20 15 20   Temp: 98.3 °F (36.8 °C)      TempSrc:       SpO2: 100% 99% 99% 99%   Weight:       Height:             Opportunity for questions and clarification was provided.

## 2024-08-25 NOTE — PROGRESS NOTES
Pt states her established obgyn is Dr. Dickson. New consult called to Dr. Dickson's answering service.

## 2024-08-25 NOTE — PLAN OF CARE
Problem: Pain  Goal: Verbalizes/displays adequate comfort level or baseline comfort level  Outcome: Progressing     Problem: Safety - Adult  Goal: Free from fall injury  Outcome: Progressing     Problem: Gastrointestinal - Adult  Goal: Minimal or absence of nausea and vomiting  Outcome: Progressing  Goal: Maintains or returns to baseline bowel function  Outcome: Progressing     Problem: Infection - Adult  Goal: Absence of infection at discharge  Outcome: Progressing     Problem: Metabolic/Fluid and Electrolytes - Adult  Goal: Electrolytes maintained within normal limits  Outcome: Progressing     Problem: Hematologic - Adult  Goal: Maintains hematologic stability  Outcome: Progressing

## 2024-08-25 NOTE — ED NOTES
...ED to Inpatient Handoff Report    Notified Faye that electronic handoff available and patient ready for transport to room 525.    Safety Risks: None identified    Patient in Restraints: no    Constant Observer or Patient : no    Telemetry Monitoring Ordered :Yes           Order to transfer to unit without monitor:YES    Last MEWS: 1 Time completed: 1545    Deterioration Index Score:   Predictive Model Details   Calculating. Refresh or re-add the SmartLink.       Vitals:    08/25/24 1524 08/25/24 1534 08/25/24 1544 08/25/24 1545   BP:    127/80   Pulse: 89 87 78 77   Resp: 21 21 18 20   Temp:    97.2 °F (36.2 °C)   TempSrc:       SpO2: 100% 100% 100% 100%   Weight:       Height:             Opportunity for questions and clarification was provided.

## 2024-08-25 NOTE — ED PROVIDER NOTES
Shared [facility]-ED Attending Visit.  CC: No           Department of Emergency Medicine   ED  Provider Note  Admit Date/RoomTime: 2024 11:24 AM  ED Room: JASON/JASON    Chief Complaint       Nausea and Emesis (Started lastnight- has been eating recalled turkey)    History of Present Illness      Nicolle Hernandez is a 43 y.o. old female who presents to the ED with nausea and vomiting.  Patient states she has also had diarrhea.  She states her symptoms have been ongoing for the past couple weeks.  She states she has been eating recalled turkey meat just found out yesterday that it was recalled.  She denies any dark/tarry stools or rectal bleeding.  Patient has no urinary complaints.  She states she is currently on her menstrual cycle but has been having irregular periods over the past several months.  She has not been able to get into see her OB/GYN yet.  Patient is denying any chest pain, shortness of breath, or pain with breathing.  She states she does feel tired and weak.  She denies any fever/chills.  She has no recent trauma or injury.  She denies recent travel.  She denies any other suspicious food intake.  Patient is alert and oriented x 3 and in no apparent distress at this exam.  She is nontoxic-appearing.    PCP: Neris Vences, STEPHANI - LEILANI    ROS   Pertinent positives and negatives are stated within HPI, all other systems reviewed and are negative.    Past Medical History:  has a past medical history of Anemia, Asthma, Hypertension, Other congenital or acquired abnormality of cervix, antepartum condition or complication, PCOS (polycystic ovarian syndrome), and Thyroid disease.    Past Surgical History:  has a past surgical history that includes  section; LEEP; Abdomen surgery; Cervical Cerclage (2013); and Thyroidectomy, partial.    Social History:  reports that she has never smoked. She has never used smokeless tobacco. She reports current alcohol use. She reports that she does not use  34.0 - 48.0 %   Lactic Acid   Result Value Ref Range    Lactic Acid 0.9 0.5 - 2.2 mmol/L   POC Pregnancy Urine Qual   Result Value Ref Range    HCG, Urine, POC Negative Negative    Lot Number [id number]     Positive QC Pass/Fail Acceptable     Negative QC Pass/Fail Acceptable    EKG 12 Lead   Result Value Ref Range    Ventricular Rate 82 BPM    Atrial Rate 82 BPM    P-R Interval 140 ms    QRS Duration 84 ms    Q-T Interval 378 ms    QTc Calculation (Bazett) 441 ms    P Axis 31 degrees    R Axis 29 degrees    T Axis 21 degrees   TYPE AND SCREEN   Result Value Ref Range    Blood Bank Sample Expiration 08/28/2024,2359     Arm Band Number [id number]     ABO/Rh A POSITIVE     Antibody Screen NEGATIVE     Unit Number [id number]     Component Leukocyte Reduced Red Cell     Unit Divison 00     Dispense Status Blood Bank ISSUED     Unit Issue Date/Time [id number]     Product Code Blood Bank [id number]     Blood Bank Unit Type and Rh A POS     Blood Bank ISBT Product Blood Type 6200     Blood Bank Blood Product Expiration Date [id number]     Transfusion Status OK TO TRANSFUSE     Crossmatch Result COMPATIBLE      Imaging:  All Radiology results interpreted by Radiologist unless otherwise noted.  US PELVIS COMPLETE   Final Result   Enlarged heterogenous uterus with several fibroids.         CT ABDOMEN PELVIS W IV CONTRAST Additional Contrast? None   Final Result   1. Enlarged heterogeneous uterus with findings consistent with myomatous   changes some of which may represent degeneration and pedunculated shin   rightward extending into the abdomen up to level of the umbilicus of   enlargement and edema with an apparent 4.9 cm submucosal fibroid may be   pedunculated along with a intramural left lateral 7.5 x 6.8 x 6.7 cm fibroid   with potential degeneration given hyperdense signal and contents extending to   the lower uterine segment with fluid-filled components throughout and within   the cervix. Malignancy would be difficult to

## 2024-08-26 ENCOUNTER — ANESTHESIA EVENT (OUTPATIENT)
Dept: OPERATING ROOM | Age: 44
DRG: 744 | End: 2024-08-26

## 2024-08-26 ENCOUNTER — ANESTHESIA (OUTPATIENT)
Dept: OPERATING ROOM | Age: 44
DRG: 744 | End: 2024-08-26

## 2024-08-26 LAB
ABO/RH: NORMAL
ANION GAP SERPL CALCULATED.3IONS-SCNC: 10 MMOL/L (ref 7–16)
ANTIBODY SCREEN: NEGATIVE
ARM BAND NUMBER: NORMAL
BASOPHILS # BLD: 0.07 K/UL (ref 0–0.2)
BASOPHILS NFR BLD: 1 % (ref 0–2)
BLOOD BANK BLOOD PRODUCT EXPIRATION DATE: NORMAL
BLOOD BANK BLOOD PRODUCT EXPIRATION DATE: NORMAL
BLOOD BANK DISPENSE STATUS: NORMAL
BLOOD BANK DISPENSE STATUS: NORMAL
BLOOD BANK ISBT PRODUCT BLOOD TYPE: 6200
BLOOD BANK ISBT PRODUCT BLOOD TYPE: 6200
BLOOD BANK PRODUCT CODE: NORMAL
BLOOD BANK PRODUCT CODE: NORMAL
BLOOD BANK SAMPLE EXPIRATION: NORMAL
BLOOD BANK UNIT TYPE AND RH: NORMAL
BLOOD BANK UNIT TYPE AND RH: NORMAL
BPU ID: NORMAL
BPU ID: NORMAL
BUN SERPL-MCNC: 5 MG/DL (ref 6–20)
CALCIUM SERPL-MCNC: 8.8 MG/DL (ref 8.6–10.2)
CHLORIDE SERPL-SCNC: 104 MMOL/L (ref 98–107)
CO2 SERPL-SCNC: 24 MMOL/L (ref 22–29)
COMPONENT: NORMAL
COMPONENT: NORMAL
CREAT SERPL-MCNC: 0.7 MG/DL (ref 0.5–1)
CROSSMATCH RESULT: NORMAL
CROSSMATCH RESULT: NORMAL
EKG ATRIAL RATE: 82 BPM
EKG P AXIS: 31 DEGREES
EKG P-R INTERVAL: 140 MS
EKG Q-T INTERVAL: 378 MS
EKG QRS DURATION: 84 MS
EKG QTC CALCULATION (BAZETT): 441 MS
EKG R AXIS: 29 DEGREES
EKG T AXIS: 21 DEGREES
EKG VENTRICULAR RATE: 82 BPM
EOSINOPHIL # BLD: 0.14 K/UL (ref 0.05–0.5)
EOSINOPHILS RELATIVE PERCENT: 1 % (ref 0–6)
ERYTHROCYTE [DISTWIDTH] IN BLOOD BY AUTOMATED COUNT: 29.5 % (ref 11.5–15)
GFR, ESTIMATED: >90 ML/MIN/1.73M2
GLUCOSE SERPL-MCNC: 87 MG/DL (ref 74–99)
HCT VFR BLD AUTO: 25.8 % (ref 34–48)
HCT VFR BLD AUTO: 26.3 % (ref 34–48)
HGB BLD-MCNC: 7.2 G/DL (ref 11.5–15.5)
HGB BLD-MCNC: 7.3 G/DL (ref 11.5–15.5)
IMM GRANULOCYTES # BLD AUTO: 0.04 K/UL (ref 0–0.58)
IMM GRANULOCYTES NFR BLD: 0 % (ref 0–5)
LYMPHOCYTES NFR BLD: 0.86 K/UL (ref 1.5–4)
LYMPHOCYTES RELATIVE PERCENT: 8 % (ref 20–42)
MCH RBC QN AUTO: 19.3 PG (ref 26–35)
MCHC RBC AUTO-ENTMCNC: 27.4 G/DL (ref 32–34.5)
MCV RBC AUTO: 70.5 FL (ref 80–99.9)
MONOCYTES NFR BLD: 0.56 K/UL (ref 0.1–0.95)
MONOCYTES NFR BLD: 5 % (ref 2–12)
NEUTROPHILS NFR BLD: 84 % (ref 43–80)
NEUTS SEG NFR BLD: 8.69 K/UL (ref 1.8–7.3)
PLATELET, FLUORESCENCE: 442 K/UL (ref 130–450)
PMV BLD AUTO: 10.1 FL (ref 7–12)
POTASSIUM SERPL-SCNC: 3.4 MMOL/L (ref 3.5–5)
RBC # BLD AUTO: 3.73 M/UL (ref 3.5–5.5)
RBC # BLD: ABNORMAL 10*6/UL
SODIUM SERPL-SCNC: 138 MMOL/L (ref 132–146)
TRANSFUSION STATUS: NORMAL
TRANSFUSION STATUS: NORMAL
UNIT DIVISION: 0
UNIT DIVISION: 0
UNIT ISSUE DATE/TIME: NORMAL
UNIT ISSUE DATE/TIME: NORMAL
WBC OTHER # BLD: 10.4 K/UL (ref 4.5–11.5)

## 2024-08-26 PROCEDURE — 2580000003 HC RX 258: Performed by: OBSTETRICS & GYNECOLOGY

## 2024-08-26 PROCEDURE — 2060000000 HC ICU INTERMEDIATE R&B

## 2024-08-26 PROCEDURE — 93010 ELECTROCARDIOGRAM REPORT: CPT | Performed by: INTERNAL MEDICINE

## 2024-08-26 PROCEDURE — 6360000002 HC RX W HCPCS: Performed by: STUDENT IN AN ORGANIZED HEALTH CARE EDUCATION/TRAINING PROGRAM

## 2024-08-26 PROCEDURE — 3600000013 HC SURGERY LEVEL 3 ADDTL 15MIN: Performed by: OBSTETRICS & GYNECOLOGY

## 2024-08-26 PROCEDURE — 94640 AIRWAY INHALATION TREATMENT: CPT

## 2024-08-26 PROCEDURE — G0378 HOSPITAL OBSERVATION PER HR: HCPCS

## 2024-08-26 PROCEDURE — 85014 HEMATOCRIT: CPT

## 2024-08-26 PROCEDURE — 96375 TX/PRO/DX INJ NEW DRUG ADDON: CPT

## 2024-08-26 PROCEDURE — 7100000001 HC PACU RECOVERY - ADDTL 15 MIN: Performed by: OBSTETRICS & GYNECOLOGY

## 2024-08-26 PROCEDURE — 6360000002 HC RX W HCPCS

## 2024-08-26 PROCEDURE — 3700000000 HC ANESTHESIA ATTENDED CARE: Performed by: OBSTETRICS & GYNECOLOGY

## 2024-08-26 PROCEDURE — 99232 SBSQ HOSP IP/OBS MODERATE 35: CPT | Performed by: INTERNAL MEDICINE

## 2024-08-26 PROCEDURE — 96376 TX/PRO/DX INJ SAME DRUG ADON: CPT

## 2024-08-26 PROCEDURE — 2709999900 HC NON-CHARGEABLE SUPPLY: Performed by: OBSTETRICS & GYNECOLOGY

## 2024-08-26 PROCEDURE — 88305 TISSUE EXAM BY PATHOLOGIST: CPT

## 2024-08-26 PROCEDURE — 80048 BASIC METABOLIC PNL TOTAL CA: CPT

## 2024-08-26 PROCEDURE — 85018 HEMOGLOBIN: CPT

## 2024-08-26 PROCEDURE — 85025 COMPLETE CBC W/AUTO DIFF WBC: CPT

## 2024-08-26 PROCEDURE — 0UDB8ZZ EXTRACTION OF ENDOMETRIUM, VIA NATURAL OR ARTIFICIAL OPENING ENDOSCOPIC: ICD-10-PCS | Performed by: OBSTETRICS & GYNECOLOGY

## 2024-08-26 PROCEDURE — 7100000000 HC PACU RECOVERY - FIRST 15 MIN: Performed by: OBSTETRICS & GYNECOLOGY

## 2024-08-26 PROCEDURE — 3600000003 HC SURGERY LEVEL 3 BASE: Performed by: OBSTETRICS & GYNECOLOGY

## 2024-08-26 PROCEDURE — 2580000003 HC RX 258: Performed by: STUDENT IN AN ORGANIZED HEALTH CARE EDUCATION/TRAINING PROGRAM

## 2024-08-26 PROCEDURE — 6360000002 HC RX W HCPCS: Performed by: INTERNAL MEDICINE

## 2024-08-26 PROCEDURE — 3700000001 HC ADD 15 MINUTES (ANESTHESIA): Performed by: OBSTETRICS & GYNECOLOGY

## 2024-08-26 PROCEDURE — 6370000000 HC RX 637 (ALT 250 FOR IP): Performed by: STUDENT IN AN ORGANIZED HEALTH CARE EDUCATION/TRAINING PROGRAM

## 2024-08-26 PROCEDURE — 6370000000 HC RX 637 (ALT 250 FOR IP): Performed by: INTERNAL MEDICINE

## 2024-08-26 PROCEDURE — 2500000003 HC RX 250 WO HCPCS

## 2024-08-26 RX ORDER — SODIUM CHLORIDE 0.9 % (FLUSH) 0.9 %
5-40 SYRINGE (ML) INJECTION PRN
Status: DISCONTINUED | OUTPATIENT
Start: 2024-08-26 | End: 2024-08-26 | Stop reason: HOSPADM

## 2024-08-26 RX ORDER — FENTANYL CITRATE 50 UG/ML
50 INJECTION, SOLUTION INTRAMUSCULAR; INTRAVENOUS EVERY 5 MIN PRN
Status: DISCONTINUED | OUTPATIENT
Start: 2024-08-26 | End: 2024-08-26 | Stop reason: HOSPADM

## 2024-08-26 RX ORDER — MIDAZOLAM HYDROCHLORIDE 1 MG/ML
INJECTION INTRAMUSCULAR; INTRAVENOUS PRN
Status: DISCONTINUED | OUTPATIENT
Start: 2024-08-26 | End: 2024-08-26 | Stop reason: SDUPTHER

## 2024-08-26 RX ORDER — DEXAMETHASONE SODIUM PHOSPHATE 4 MG/ML
INJECTION, SOLUTION INTRA-ARTICULAR; INTRALESIONAL; INTRAMUSCULAR; INTRAVENOUS; SOFT TISSUE PRN
Status: DISCONTINUED | OUTPATIENT
Start: 2024-08-26 | End: 2024-08-26 | Stop reason: SDUPTHER

## 2024-08-26 RX ORDER — NALOXONE HYDROCHLORIDE 0.4 MG/ML
INJECTION, SOLUTION INTRAMUSCULAR; INTRAVENOUS; SUBCUTANEOUS PRN
Status: DISCONTINUED | OUTPATIENT
Start: 2024-08-26 | End: 2024-08-26 | Stop reason: HOSPADM

## 2024-08-26 RX ORDER — LIDOCAINE HYDROCHLORIDE 20 MG/ML
INJECTION, SOLUTION EPIDURAL; INFILTRATION; INTRACAUDAL; PERINEURAL PRN
Status: DISCONTINUED | OUTPATIENT
Start: 2024-08-26 | End: 2024-08-26 | Stop reason: SDUPTHER

## 2024-08-26 RX ORDER — SODIUM CHLORIDE 9 MG/ML
INJECTION, SOLUTION INTRAVENOUS PRN
Status: DISCONTINUED | OUTPATIENT
Start: 2024-08-26 | End: 2024-08-26 | Stop reason: HOSPADM

## 2024-08-26 RX ORDER — FENTANYL CITRATE 50 UG/ML
INJECTION, SOLUTION INTRAMUSCULAR; INTRAVENOUS PRN
Status: DISCONTINUED | OUTPATIENT
Start: 2024-08-26 | End: 2024-08-26 | Stop reason: SDUPTHER

## 2024-08-26 RX ORDER — GLYCOPYRROLATE 0.2 MG/ML
INJECTION INTRAMUSCULAR; INTRAVENOUS PRN
Status: DISCONTINUED | OUTPATIENT
Start: 2024-08-26 | End: 2024-08-26 | Stop reason: SDUPTHER

## 2024-08-26 RX ORDER — ONDANSETRON 2 MG/ML
INJECTION INTRAMUSCULAR; INTRAVENOUS PRN
Status: DISCONTINUED | OUTPATIENT
Start: 2024-08-26 | End: 2024-08-26 | Stop reason: SDUPTHER

## 2024-08-26 RX ORDER — ACETAMINOPHEN 325 MG/1
650 TABLET ORAL EVERY 4 HOURS PRN
Status: DISCONTINUED | OUTPATIENT
Start: 2024-08-26 | End: 2024-08-27 | Stop reason: HOSPADM

## 2024-08-26 RX ORDER — TRANEXAMIC ACID 650 MG/1
1300 TABLET ORAL 3 TIMES DAILY
Qty: 30 TABLET | Refills: 3 | Status: SHIPPED | OUTPATIENT
Start: 2024-08-26 | End: 2024-08-31

## 2024-08-26 RX ORDER — PROCHLORPERAZINE EDISYLATE 5 MG/ML
5 INJECTION INTRAMUSCULAR; INTRAVENOUS
Status: DISCONTINUED | OUTPATIENT
Start: 2024-08-26 | End: 2024-08-26 | Stop reason: HOSPADM

## 2024-08-26 RX ORDER — SODIUM CHLORIDE 0.9 % (FLUSH) 0.9 %
5-40 SYRINGE (ML) INJECTION EVERY 12 HOURS SCHEDULED
Status: DISCONTINUED | OUTPATIENT
Start: 2024-08-26 | End: 2024-08-26 | Stop reason: HOSPADM

## 2024-08-26 RX ORDER — SODIUM CHLORIDE, SODIUM LACTATE, POTASSIUM CHLORIDE, CALCIUM CHLORIDE 600; 310; 30; 20 MG/100ML; MG/100ML; MG/100ML; MG/100ML
INJECTION, SOLUTION INTRAVENOUS CONTINUOUS
Status: DISCONTINUED | OUTPATIENT
Start: 2024-08-26 | End: 2024-08-26 | Stop reason: HOSPADM

## 2024-08-26 RX ORDER — POTASSIUM CHLORIDE 7.45 MG/ML
10 INJECTION INTRAVENOUS
Status: COMPLETED | OUTPATIENT
Start: 2024-08-26 | End: 2024-08-26

## 2024-08-26 RX ORDER — PROPOFOL 10 MG/ML
INJECTION, EMULSION INTRAVENOUS PRN
Status: DISCONTINUED | OUTPATIENT
Start: 2024-08-26 | End: 2024-08-26 | Stop reason: SDUPTHER

## 2024-08-26 RX ORDER — OXYCODONE HYDROCHLORIDE 5 MG/1
5 TABLET ORAL
Status: DISCONTINUED | OUTPATIENT
Start: 2024-08-26 | End: 2024-08-26 | Stop reason: HOSPADM

## 2024-08-26 RX ADMIN — PROPOFOL 30 MG: 10 INJECTION, EMULSION INTRAVENOUS at 17:14

## 2024-08-26 RX ADMIN — ACETAMINOPHEN 650 MG: 325 TABLET ORAL at 09:34

## 2024-08-26 RX ADMIN — SODIUM CHLORIDE, POTASSIUM CHLORIDE, SODIUM LACTATE AND CALCIUM CHLORIDE: 600; 310; 30; 20 INJECTION, SOLUTION INTRAVENOUS at 17:44

## 2024-08-26 RX ADMIN — FENTANYL CITRATE 50 MCG: 50 INJECTION, SOLUTION INTRAMUSCULAR; INTRAVENOUS at 17:11

## 2024-08-26 RX ADMIN — CETIRIZINE HYDROCHLORIDE 10 MG: 10 TABLET, FILM COATED ORAL at 20:42

## 2024-08-26 RX ADMIN — PROPOFOL 120 MG: 10 INJECTION, EMULSION INTRAVENOUS at 17:11

## 2024-08-26 RX ADMIN — LIDOCAINE HYDROCHLORIDE 50 MG: 20 INJECTION, SOLUTION EPIDURAL; INFILTRATION; INTRACAUDAL; PERINEURAL at 17:11

## 2024-08-26 RX ADMIN — WATER 1000 MG: 1 INJECTION INTRAMUSCULAR; INTRAVENOUS; SUBCUTANEOUS at 15:50

## 2024-08-26 RX ADMIN — POTASSIUM CHLORIDE 10 MEQ: 7.46 INJECTION, SOLUTION INTRAVENOUS at 14:52

## 2024-08-26 RX ADMIN — AMLODIPINE BESYLATE 5 MG: 5 TABLET ORAL at 20:42

## 2024-08-26 RX ADMIN — GLYCOPYRROLATE 0.2 MG: 0.2 INJECTION, SOLUTION INTRAMUSCULAR; INTRAVENOUS at 17:10

## 2024-08-26 RX ADMIN — DEXAMETHASONE SODIUM PHOSPHATE 8 MG: 4 INJECTION, SOLUTION INTRAMUSCULAR; INTRAVENOUS at 17:08

## 2024-08-26 RX ADMIN — SODIUM CHLORIDE, POTASSIUM CHLORIDE, SODIUM LACTATE AND CALCIUM CHLORIDE: 600; 310; 30; 20 INJECTION, SOLUTION INTRAVENOUS at 10:55

## 2024-08-26 RX ADMIN — PROPOFOL 50 MG: 10 INJECTION, EMULSION INTRAVENOUS at 17:18

## 2024-08-26 RX ADMIN — LISINOPRIL 20 MG: 20 TABLET ORAL at 20:42

## 2024-08-26 RX ADMIN — BUDESONIDE 500 MCG: 0.5 SUSPENSION RESPIRATORY (INHALATION) at 08:53

## 2024-08-26 RX ADMIN — POTASSIUM CHLORIDE 10 MEQ: 7.46 INJECTION, SOLUTION INTRAVENOUS at 13:40

## 2024-08-26 RX ADMIN — Medication 2000 UNITS: at 20:42

## 2024-08-26 RX ADMIN — OMEGA-3-ACID ETHYL ESTERS CAPSULES 2 CAPSULE: 1 CAPSULE, LIQUID FILLED ORAL at 20:42

## 2024-08-26 RX ADMIN — MIDAZOLAM 2 MG: 1 INJECTION INTRAMUSCULAR; INTRAVENOUS at 17:06

## 2024-08-26 RX ADMIN — ACETAMINOPHEN 650 MG: 325 TABLET ORAL at 20:42

## 2024-08-26 RX ADMIN — FENTANYL CITRATE 50 MCG: 50 INJECTION, SOLUTION INTRAMUSCULAR; INTRAVENOUS at 17:08

## 2024-08-26 RX ADMIN — PANTOPRAZOLE SODIUM 40 MG: 40 TABLET, DELAYED RELEASE ORAL at 08:45

## 2024-08-26 RX ADMIN — FERROUS SULFATE TAB 325 MG (65 MG ELEMENTAL FE) 325 MG: 325 (65 FE) TAB at 20:42

## 2024-08-26 RX ADMIN — ONDANSETRON 4 MG: 2 INJECTION INTRAMUSCULAR; INTRAVENOUS at 17:08

## 2024-08-26 ASSESSMENT — PAIN SCALES - GENERAL
PAINLEVEL_OUTOF10: 0
PAINLEVEL_OUTOF10: 0
PAINLEVEL_OUTOF10: 6
PAINLEVEL_OUTOF10: 0
PAINLEVEL_OUTOF10: 3
PAINLEVEL_OUTOF10: 2

## 2024-08-26 ASSESSMENT — PAIN DESCRIPTION - PAIN TYPE: TYPE: ACUTE PAIN

## 2024-08-26 ASSESSMENT — PAIN DESCRIPTION - ONSET: ONSET: GRADUAL

## 2024-08-26 ASSESSMENT — PAIN DESCRIPTION - LOCATION
LOCATION: HEAD
LOCATION: VAGINA

## 2024-08-26 ASSESSMENT — PAIN DESCRIPTION - FREQUENCY: FREQUENCY: CONTINUOUS

## 2024-08-26 ASSESSMENT — PAIN - FUNCTIONAL ASSESSMENT: PAIN_FUNCTIONAL_ASSESSMENT: ACTIVITIES ARE NOT PREVENTED

## 2024-08-26 ASSESSMENT — PAIN DESCRIPTION - DESCRIPTORS
DESCRIPTORS: ACHING
DESCRIPTORS: CRAMPING

## 2024-08-26 ASSESSMENT — LIFESTYLE VARIABLES: SMOKING_STATUS: 0

## 2024-08-26 NOTE — CONSULTS
Department of Gynecology  Attending Consult Note      Reason for Consult: Menorrhagia and fibroid uterus    CHIEF COMPLAINT:   Fatigue    History obtained from electronic medical record    HISTORY OF PRESENT ILLNESS:                   The patient is a 43 y.o. female who presented to the emergency department with longstanding history of heavy vaginal bleeding.  Critical hemoglobin of 4 g was noted.  Imaging was performed which shows uterus with multiple uterine fibroids.    Past Medical History:        Diagnosis Date    Anemia 2024    Asthma     Hypertension     Other congenital or acquired abnormality of cervix, antepartum condition or complication 2013    PCOS (polycystic ovarian syndrome)     Thyroid disease      Past Surgical History:        Procedure Laterality Date    ABDOMEN SURGERY      csection    CERVICAL CERCLAGE  2013     SECTION      LEEP      THYROIDECTOMY, PARTIAL           OB History    Para Term  AB Living   4 2 1 1 2 1   SAB IAB Ectopic Molar Multiple Live Births   2         1      # Outcome Date GA Lbr Bandar/2nd Weight Sex Type Anes PTL Lv   4  13 24w5d  0.709 kg (1 lb 9 oz) F CS-LTranv      3 Term 99 38w0d  3.459 kg (7 lb 10 oz) F CS-LTranv Spinal N LISSETTE      Birth Comments: preeclampsia   2 SAB            1 SAB                Medications Prior to Admission:   Medications Prior to Admission: omeprazole (PRILOSEC) 20 MG delayed release capsule, Take 1 capsule by mouth every morning (before breakfast)  amLODIPine (NORVASC) 5 MG tablet, take 1 tablet by mouth once daily  lisinopril (PRINIVIL;ZESTRIL) 20 MG tablet, take 1 tablet by mouth once daily  Cholecalciferol (VITAMIN D3) 50 MCG (2000 UT) CAPS, take 1 capsule by mouth once daily  fluticasone (FLOVENT HFA) 220 MCG/ACT inhaler, Inhale 1 puff into the lungs 2 times daily  Cetirizine HCl 10 MG CAPS, Take by mouth daily  metFORMIN (GLUCOPHAGE) 500 MG tablet, Take 1 tablet by mouth 2 times daily

## 2024-08-26 NOTE — PROGRESS NOTES
While rounding unable to visit due to patient being addressed by the staff at the time.  remains available for support.

## 2024-08-26 NOTE — CONSULTS
[facility]  Hematology/Oncology  Consult      Patient Name: Nicolle Hernandez  YOB: 1980  PCP: Neris Vences APRN - CNP   Referring Provider:      Reason for Consultation:   Chief Complaint   Patient presents with    Nausea    Emesis     Started lastnight- has been eating recalled turkey        History of Present Illness:  This is a 43-year-old female patient with a PMH of anemia, asthma, HTN, PCOS. Thyroid disease who presented to the ED for evaluation of fatigue. Patient's hgb was found to be 5.4 then 4.8. S/p pRBC's she is now 7.3. CT A/P enlarged heterogeneous uterus with findings consistent with myomatous changes some of which may represent degeneration and pedunculated shin rightward extending into the abdomen up to level of the umbilicus of enlargement and edema with an apparent 4.9 cm submucosal fibroid may be pedunculated along with a intramural left lateral 7.5 x 6.8 x 6.7 cm fibroid with potential degeneration given hyperdense signal and contents extending to the lower uterine segment with fluid-filled components throughout and within the cervix. Malignancy would be difficult to exclude such as leiomyosarcoma. Consider tissue sampling and/or MRI pelvis for further evaluation. Enlarged aortocaval 1 cm lymph node increased from prior comparison. Mild bilateral hydronephrosis may be from mass effect of the uterus. Mild hepatic steatosis.  Abdominal US showed enlarged heterogenous uterus with several fibroids. On IV antibiotics. OBGYN consulted with plans for hysteroscopy dilation and curettage today. Iron 9, TIBC 530, iron sat 2, ferritin 9. Consultation for anemia.       Diagnostic Data:     Past Medical History:   Diagnosis Date    Anemia 8/25/2024    Asthma     Hypertension     Other congenital or acquired abnormality of cervix, antepartum condition or complication 02/22/2013    PCOS (polycystic ovarian syndrome)     Thyroid disease        Patient Active Problem List

## 2024-08-26 NOTE — ANESTHESIA POSTPROCEDURE EVALUATION
Department of Anesthesiology  Postprocedure Note    Patient: Nicolle Hernandez  MRN: [id number]  YOB: 1980  Date of evaluation: 8/26/2024    Procedure Summary       Date: 08/26/24 Room / Location: [address]    Anesthesia Start: 1702 Anesthesia Stop: 1758    Procedure: HYSTEROSCOPY DILATATION AND CURETTAGE (Vagina ) Diagnosis:       Anemia, unspecified type      (Anemia, unspecified type [D64.9])    Surgeons: John Irving MD Responsible Provider: Pam Kendrick DO    Anesthesia Type: general ASA Status: 3            Anesthesia Type: No value filed.    Cj Phase I: Cj Score: 10    Cj Phase II:      Anesthesia Post Evaluation    Patient location during evaluation: PACU  Patient participation: complete - patient participated  Level of consciousness: awake and alert  Nausea & Vomiting: no vomiting and no nausea  Cardiovascular status: hemodynamically stable  Respiratory status: acceptable and spontaneous ventilation  Hydration status: stable  Pain management: adequate    No notable events documented.

## 2024-08-26 NOTE — ANESTHESIA PRE PROCEDURE
cetirizine (ZYRTEC) tablet 10 mg  10 mg Oral Daily Bob Davis MD   10 mg at 24 1843    cefTRIAXone (ROCEPHIN) 1,000 mg in sterile water 10 mL IV syringe  1,000 mg IntraVENous Q24H Bob Davis MD   1,000 mg at 24 1550    albuterol (PROVENTIL) (2.5 MG/3ML) 0.083% nebulizer solution 2.5 mg  2.5 mg Nebulization Q4H PRN Bob Davis MD           Allergies:    Allergies   Allergen Reactions    Latex Itching    Food      fish       Problem List:    Patient Active Problem List   Diagnosis Code    Asthma J45.909    Obesity complicating pregnancy, childbirth, or puerperium, antepartum O99.210    Obese E66.9    Benign essential hypertension, antepartum O10.019    Cervical shortening, antepartum condition or complication O26.879    Other congenital or acquired abnormality of cervix, antepartum condition or complication O34.40    Incompetent cervix in pregnancy s/p rescue cerclage 3/23/13 O34.30    Premature cervical dilation O34.30    Threatened  labor O47.00    Oligohydramnios with  problem O41.00X0     premature rupture of membranes 13 O42.919    SPROM (prolonged spontaneous rupture of membranes) O42.90    Essential hypertension I10    Polycystic ovarian disease E28.2    Dehydration E86.0    Sepsis (HCC) A41.9    Hypokalemia E87.6    Precordial pain R07.2    Palpitations R00.2    AVM (arteriovenous malformation) Q27.30    Acute upper respiratory infection J06.9    PCOS (polycystic ovarian syndrome) E28.2    Anemia D64.9       Past Medical History:        Diagnosis Date    Anemia 2024    Asthma     Hypertension     Other congenital or acquired abnormality of cervix, antepartum condition or complication 2013    PCOS (polycystic ovarian syndrome)     Thyroid disease        Past Surgical History:        Procedure Laterality Date    ABDOMEN SURGERY      csection    CERVICAL CERCLAGE  2013     SECTION      LEEP      THYROIDECTOMY, PARTIAL    ADDENDUM:    Pt seen and examined, physical exam updated, chart reviewed including anesthesia, drug and allergy history.      H&P reviewed.  No interval changes to history or physical examination (unless noted above).    NPO status confirmed.    Anesthetic plan, risks, benefits, alternatives discussed with patient.    Patient verbalized an understanding and agrees to proceed.     Gayle Kendrick DO  Staff Anesthesiologist          Pam Kendrick DO   8/26/2024      Reviewed and agrees with above note.    Lola Solomon, STEPHANI-CRNA

## 2024-08-26 NOTE — PROGRESS NOTES
[facility] Quality Flow/Interdisciplinary Rounds Progress Note        Quality Flow Rounds held on August 26, 2024    Disciplines Attending:  Bedside Nurse, , , and Nursing Unit Leadership    Nicolle Hernandez was admitted on 8/25/2024 11:24 AM    Anticipated Discharge Date:       Disposition:    Ritesh Score:  Ritesh Scale Score: 23    Readmission Risk              Risk of Unplanned Readmission:  8           Discussed patient goal for the day, patient clinical progression, and barriers to discharge.  The following Goal(s) of the Day/Commitment(s) have been identified:   discharge planning, D&C today, C-diff R/O, multiple PRBC      Milad Buchanan RN  August 26, 2024

## 2024-08-26 NOTE — PROGRESS NOTES
[facility] Hospitalist Progress Note    Admitting Date and Time: 8/25/2024 11:24 AM  Admit Dx: Enlarged uterus [N85.2]  Anemia [D64.9]  Abnormal uterine bleeding [N93.9]  Lower abdominal pain [R10.30]  Nausea, vomiting, and diarrhea [R11.2, R19.7]  Uterine leiomyoma, unspecified location [D25.9]  Anemia, unspecified type [D64.9]    Subjective:  Patient is being followed for Enlarged uterus [N85.2]  Anemia [D64.9]  Abnormal uterine bleeding [N93.9]  Lower abdominal pain [R10.30]  Nausea, vomiting, and diarrhea [R11.2, R19.7]  Uterine leiomyoma, unspecified location [D25.9]  Anemia, unspecified type [D64.9]   Pt seen and examined this morning  No acute events overnight  Denies any acute complaints today.  Stated the bleeding slowed down    ROS: denies fever, chills, cp, sob, n/v, HA unless stated above.      sodium chloride flush  5-40 mL IntraVENous 2 times per day    cefOXitin  2,000 mg IntraVENous On Call to OR    amLODIPine  5 mg Oral Daily    Vitamin D  2,000 Units Oral Daily    ferrous sulfate  325 mg Oral Daily    budesonide  0.5 mg Nebulization BID RT    lisinopril  20 mg Oral Daily    montelukast  10 mg Oral Nightly    pantoprazole  40 mg Oral BID AC    omega-3 acid ethyl esters  2 capsule Oral BID    cetirizine  10 mg Oral Daily    cefTRIAXone (ROCEPHIN) IV  1,000 mg IntraVENous Q24H     acetaminophen, 650 mg, Q4H PRN  sodium chloride flush, 5-40 mL, PRN  sodium chloride, , PRN  sodium chloride, , PRN  albuterol, 2.5 mg, Q4H PRN         Objective:    /76   Pulse 75   Temp 98.3 °F (36.8 °C) (Oral)   Resp 16   Ht 1.499 m (4' 11\")   Wt 68.9 kg (152 lb)   SpO2 100%   BMI 30.70 kg/m²     General Appearance: alert and oriented to person, place and time and in no acute distress  Skin: warm and dry  Head: normocephalic and atraumatic  Eyes: pupils equal, round, and reactive to light, extraocular eye movements intact, conjunctivae normal  Neck: neck supple and non tender without mass

## 2024-08-26 NOTE — CARE COORDINATION
Social Work / Discharge Planning : Patient admitted with enlarged uterus. OB/GYN on board. Patient independent from HOME and will return at discharge. Patient has a established provider . SW spoke to Bekah from [employer] and she verified patient medicaid termed 05/31. Patient is currently employed and does NOT meet the requirements for medicaid. She is eligible for financial assist. Can explore [facility] at discharge if needed. . Await OB plan. Anticipating no needs for SW. SW to follow. Electronically signed by MAMTA Willson on 8/26/24 at 10:54 AM EDT

## 2024-08-26 NOTE — PROGRESS NOTES
Department of Gynecology  Attending Progress Note          SUBJECTIVE: Patient is now post hysteroscopy D&C.  And is in recovery room.  Family was informed of the findings.      ASSESSMENT AND PLAN:    Patient has a very large multiple fibroid uterus.  Given the patient's profound anemia would recommend hysterectomy for the patient after she is medically stable discharge.    Given her profound anemia most likely will need an iron infusion.  Will also discharge her on oral TXA.  Patient will follow-up with me in the office in 2 weeks and at that time we will begin hysterectomy planning.    John Cardoso MD  8/26/2024

## 2024-08-26 NOTE — OP NOTE
Operative Note      Patient: Nicolle Hernandez  YOB: 1980  MRN: [id number]    Date of Procedure: 8/26/2024    Pre-Op Diagnosis Codes:      * Anemia, unspecified type [D64.9], menorrhagia, fibroid uterus    Post-Op Diagnosis: Same       Procedure(s):  HYSTEROSCOPY DILATATION AND CURETTAGE    Surgeon(s):  John Irving MD    Assistant:   * No surgical staff found *    Anesthesia: General    Estimated Blood Loss (mL): Minimal    Complications: None    Specimens:   ID Type Source Tests Collected by Time Destination   A : Endometrial Curetting Tissue Tissue SURGICAL PATHOLOGY John Irving MD 8/26/2024 1730        Implants:  * No implants in log *      Drains: * No LDAs found *    Findings:  Infection Present At Time Of Surgery (PATOS) (choose all levels that have infection present): Evaluation of C. difficile    Other Findings: Markedly enlarged multiple fibroid uterus    Detailed Description of Procedure:     Patient was brought the operating room placed in dorsal supine position.  General anesthesia with laryngeal mask airway was secured.  Patient was then placed in the dorsolithotomy position and prepped and draped in usual sterile fashion.  Pelvic examination was then performed.    Pelvic examination revealed that the uterus is markedly enlarged 18 weeks size.  Multiple fibroids are noted.  The uterus is mobile and not fixed and there is access in the lower uterine segment.    Vaginal retractors were introduced cervix held with a single-tooth tenaculum cervix was very anterior and the cervix was then dilated to admit the hysteroscope.  Hysteroscopy was performed a large submucosal fibroid was seen.  No concerning endometrial tissue was identified.  Endometrium was then curettaged with a sharp curette and multiple quadrants until the more tissue was returned.  Upon completion no active bleeding was noted tenaculum is removed patient was placed in dorsal supine position woken general esthesia and

## 2024-08-26 NOTE — PROGRESS NOTES
Patient report called to 6th floor RN; family waiting room notified of patient transfer. Transport requested. [address].

## 2024-08-27 VITALS
RESPIRATION RATE: 20 BRPM | HEART RATE: 84 BPM | SYSTOLIC BLOOD PRESSURE: 124 MMHG | TEMPERATURE: 98.8 F | WEIGHT: 152 LBS | BODY MASS INDEX: 30.64 KG/M2 | OXYGEN SATURATION: 100 % | HEIGHT: 59 IN | DIASTOLIC BLOOD PRESSURE: 77 MMHG

## 2024-08-27 LAB
ANION GAP SERPL CALCULATED.3IONS-SCNC: 10 MMOL/L (ref 7–16)
BASOPHILS # BLD: 0.02 K/UL (ref 0–0.2)
BASOPHILS NFR BLD: 0 % (ref 0–2)
BUN SERPL-MCNC: 8 MG/DL (ref 6–20)
CALCIUM SERPL-MCNC: 9.3 MG/DL (ref 8.6–10.2)
CHLORIDE SERPL-SCNC: 103 MMOL/L (ref 98–107)
CO2 SERPL-SCNC: 24 MMOL/L (ref 22–29)
CREAT SERPL-MCNC: 0.6 MG/DL (ref 0.5–1)
EOSINOPHIL # BLD: 0 K/UL (ref 0.05–0.5)
EOSINOPHILS RELATIVE PERCENT: 0 % (ref 0–6)
ERYTHROCYTE [DISTWIDTH] IN BLOOD BY AUTOMATED COUNT: 30.1 % (ref 11.5–15)
GFR, ESTIMATED: >90 ML/MIN/1.73M2
GLUCOSE SERPL-MCNC: 150 MG/DL (ref 74–99)
HCT VFR BLD AUTO: 26.2 % (ref 34–48)
HGB BLD-MCNC: 7.3 G/DL (ref 11.5–15.5)
IMM GRANULOCYTES # BLD AUTO: 0.08 K/UL (ref 0–0.58)
IMM GRANULOCYTES NFR BLD: 1 % (ref 0–5)
LYMPHOCYTES NFR BLD: 0.78 K/UL (ref 1.5–4)
LYMPHOCYTES RELATIVE PERCENT: 6 % (ref 20–42)
MAGNESIUM SERPL-MCNC: 2.2 MG/DL (ref 1.6–2.6)
MCH RBC QN AUTO: 19.4 PG (ref 26–35)
MCHC RBC AUTO-ENTMCNC: 27.9 G/DL (ref 32–34.5)
MCV RBC AUTO: 69.7 FL (ref 80–99.9)
MONOCYTES NFR BLD: 0.45 K/UL (ref 0.1–0.95)
MONOCYTES NFR BLD: 4 % (ref 2–12)
NEUTROPHILS NFR BLD: 89 % (ref 43–80)
NEUTS SEG NFR BLD: 11.24 K/UL (ref 1.8–7.3)
PLATELET, FLUORESCENCE: 417 K/UL (ref 130–450)
PMV BLD AUTO: ABNORMAL FL (ref 7–12)
POTASSIUM SERPL-SCNC: 3.4 MMOL/L (ref 3.5–5)
RBC # BLD AUTO: 3.76 M/UL (ref 3.5–5.5)
RBC # BLD: ABNORMAL 10*6/UL
SODIUM SERPL-SCNC: 137 MMOL/L (ref 132–146)
WBC OTHER # BLD: 12.6 K/UL (ref 4.5–11.5)

## 2024-08-27 PROCEDURE — G0378 HOSPITAL OBSERVATION PER HR: HCPCS

## 2024-08-27 PROCEDURE — 85025 COMPLETE CBC W/AUTO DIFF WBC: CPT

## 2024-08-27 PROCEDURE — 80048 BASIC METABOLIC PNL TOTAL CA: CPT

## 2024-08-27 PROCEDURE — 99239 HOSP IP/OBS DSCHRG MGMT >30: CPT | Performed by: INTERNAL MEDICINE

## 2024-08-27 PROCEDURE — 94640 AIRWAY INHALATION TREATMENT: CPT

## 2024-08-27 PROCEDURE — 6370000000 HC RX 637 (ALT 250 FOR IP): Performed by: INTERNAL MEDICINE

## 2024-08-27 PROCEDURE — 6360000002 HC RX W HCPCS: Performed by: STUDENT IN AN ORGANIZED HEALTH CARE EDUCATION/TRAINING PROGRAM

## 2024-08-27 PROCEDURE — 6370000000 HC RX 637 (ALT 250 FOR IP): Performed by: STUDENT IN AN ORGANIZED HEALTH CARE EDUCATION/TRAINING PROGRAM

## 2024-08-27 PROCEDURE — 83735 ASSAY OF MAGNESIUM: CPT

## 2024-08-27 RX ORDER — FERROUS SULFATE 325(65) MG
1 TABLET ORAL DAILY
Qty: 30 TABLET | Refills: 3 | Status: SHIPPED | OUTPATIENT
Start: 2024-08-27

## 2024-08-27 RX ORDER — POLYETHYLENE GLYCOL 3350 17 G/17G
17 POWDER, FOR SOLUTION ORAL DAILY
Status: DISCONTINUED | OUTPATIENT
Start: 2024-08-27 | End: 2024-08-27 | Stop reason: HOSPADM

## 2024-08-27 RX ORDER — CEPHALEXIN 500 MG/1
500 CAPSULE ORAL 2 TIMES DAILY
Qty: 10 CAPSULE | Refills: 0 | Status: SHIPPED | OUTPATIENT
Start: 2024-08-27 | End: 2024-09-01

## 2024-08-27 RX ORDER — POTASSIUM CHLORIDE 1500 MG/1
40 TABLET, EXTENDED RELEASE ORAL ONCE
Status: COMPLETED | OUTPATIENT
Start: 2024-08-27 | End: 2024-08-27

## 2024-08-27 RX ADMIN — Medication 2000 UNITS: at 09:36

## 2024-08-27 RX ADMIN — AMLODIPINE BESYLATE 5 MG: 5 TABLET ORAL at 09:39

## 2024-08-27 RX ADMIN — PANTOPRAZOLE SODIUM 40 MG: 40 TABLET, DELAYED RELEASE ORAL at 05:24

## 2024-08-27 RX ADMIN — POTASSIUM CHLORIDE 40 MEQ: 1500 TABLET, EXTENDED RELEASE ORAL at 11:16

## 2024-08-27 RX ADMIN — FERROUS SULFATE TAB 325 MG (65 MG ELEMENTAL FE) 325 MG: 325 (65 FE) TAB at 09:37

## 2024-08-27 RX ADMIN — BUDESONIDE 500 MCG: 0.5 SUSPENSION RESPIRATORY (INHALATION) at 08:29

## 2024-08-27 RX ADMIN — CETIRIZINE HYDROCHLORIDE 10 MG: 10 TABLET, FILM COATED ORAL at 09:43

## 2024-08-27 RX ADMIN — ACETAMINOPHEN 650 MG: 325 TABLET ORAL at 09:18

## 2024-08-27 RX ADMIN — OMEGA-3-ACID ETHYL ESTERS CAPSULES 2 CAPSULE: 1 CAPSULE, LIQUID FILLED ORAL at 09:33

## 2024-08-27 RX ADMIN — LISINOPRIL 20 MG: 20 TABLET ORAL at 09:41

## 2024-08-27 RX ADMIN — POLYETHYLENE GLYCOL 3350 17 G: 17 POWDER, FOR SOLUTION ORAL at 09:44

## 2024-08-27 ASSESSMENT — PAIN DESCRIPTION - DESCRIPTORS: DESCRIPTORS: ACHING;CRAMPING

## 2024-08-27 ASSESSMENT — PAIN DESCRIPTION - PAIN TYPE: TYPE: ACUTE PAIN

## 2024-08-27 ASSESSMENT — PAIN - FUNCTIONAL ASSESSMENT: PAIN_FUNCTIONAL_ASSESSMENT: ACTIVITIES ARE NOT PREVENTED

## 2024-08-27 ASSESSMENT — PAIN DESCRIPTION - LOCATION: LOCATION: ABDOMEN

## 2024-08-27 ASSESSMENT — PAIN DESCRIPTION - ONSET: ONSET: ON-GOING

## 2024-08-27 ASSESSMENT — PAIN SCALES - GENERAL
PAINLEVEL_OUTOF10: 6
PAINLEVEL_OUTOF10: 0

## 2024-08-27 ASSESSMENT — PAIN DESCRIPTION - FREQUENCY: FREQUENCY: CONTINUOUS

## 2024-08-27 NOTE — PROGRESS NOTES
Discharge instructions explained to pt and copy given.  Pt verbalized understanding and is in agreement with dc plan to home.  Pt to transport self home via private car.  Meds delivered to room prior to discharge.  Pt aware additional med needs to be picked-up at her [facility] pharmacy.  Telemetry sanitized and returned to storage.

## 2024-08-27 NOTE — DISCHARGE SUMMARY
[facility] Hospitalist Physician Discharge Summary       Ney Blount MD  [address] [facility] [address]  [phone]    Schedule an appointment as soon as possible for a visit in 1 week(s)      John Irving MD  [address] [facility] [address]  [phone]    Schedule an appointment as soon as possible for a visit in 2 week(s)  hospital follow up      Activity level: As tolerated     Dispo: Home      Condition on discharge: Stable     Patient ID:  Nicolle Hernandez  [id number]  43 y.o.  1980    Admit date: 8/25/2024    Discharge date and time:  8/27/2024  4:58 PM    Admission Diagnoses: Principal Problem:    Anemia  Resolved Problems:    * No resolved hospital problems. *      Discharge Diagnoses: Principal Problem:    Anemia  Resolved Problems:    * No resolved hospital problems. *      Consults:  IP CONSULT TO GYNECOLOGIC ONCOLOGY  IP CONSULT TO HEM/ONC    Hospital Course:   Patient Nicolle Hernandez is a 43 y.o. presented with Enlarged uterus [N85.2]  Anemia [D64.9]  Abnormal uterine bleeding [N93.9]  Lower abdominal pain [R10.30]  Nausea, vomiting, and diarrhea [R11.2, R19.7]  Uterine leiomyoma, unspecified location [D25.9]  Anemia, unspecified type [D64.9]    Patient is a 43-year-old female who was admitted due to acute microcytic anemia.  Multifactorial in the setting of acute blood loss from vaginal bleed and severe iron deficiency.  Noted fibroid on ultrasound so GYN was consulted and patient underwent D&C on 8/26.  She received 2 units PRBCs while here.  Patient was also given a dose of IV iron while here.  Hematology consulted with plans to follow-up as an outpatient for more IV iron.  Patient also with concern for UTI and was started on IV antibiotics.  Hemoglobin is stable.  She is cleared for discharge and will be going home in stable condition on oral antibiotics.    Discharge Exam:    General Appearance: alert and oriented to person, place and time and in no acute  of bowel.  No inflammatory findings.  Mild-to-moderate colonic stool burden. Pelvis: Enlarged heterogeneous uterus with findings consistent of myomatous changes some of which may represent degeneration and pedunculated shin rightward extending into the abdomen up to level of the umbilicus of enlargement and edema with an apparent 4.9 cm series 302, image 116 submucosal fibroid may be pedunculated along with a intramural left lateral 7.5 x 6.8 x 6.7 cm fibroid with potential degeneration given hyperdense signal and contents extending to the lower uterine segment with fluid-filled components throughout and within the cervix.  Malignancy would be difficult to exclude such as leiomyosarcoma correlate with tissue sampling.  Urinary bladder.  Bulky pelvic adenopathy. Peritoneum/Retroperitoneum: Retroperitoneal lymph nodes include aortocaval 1 cm lymph node.  No suspicious peritoneal or mesenteric process Vasculature: Grossly normal caliber of abdominal aorta and vasculature Bones/Soft Tissues: No acute osseous or soft tissue findings.     1. Enlarged heterogeneous uterus with findings consistent with myomatous changes some of which may represent degeneration and pedunculated shin rightward extending into the abdomen up to level of the umbilicus of enlargement and edema with an apparent 4.9 cm submucosal fibroid may be pedunculated along with a intramural left lateral 7.5 x 6.8 x 6.7 cm fibroid with potential degeneration given hyperdense signal and contents extending to the lower uterine segment with fluid-filled components throughout and within the cervix. Malignancy would be difficult to exclude such as leiomyosarcoma. Consider tissue sampling and/or MRI pelvis for further evaluation. 2. Enlarged aortocaval 1 cm lymph node increased from prior comparison 3. Mild bilateral hydronephrosis may be from mass effect of the uterus. 4. Mild hepatic steatosis.       Patient Instructions:      Medication List        START taking

## 2024-08-29 NOTE — PROGRESS NOTES
CLINICAL PHARMACY NOTE: MEDS TO BEDS    Total # of Prescriptions Filled: 2   The following medications were delivered to the patient:  Ferosul 325 mg tabs  Cephalexin 500 mg caps    Additional Documentation:

## 2024-09-03 ENCOUNTER — HOSPITAL ENCOUNTER (EMERGENCY)
Age: 44
Discharge: HOME | End: 2024-09-04
Attending: EMERGENCY MEDICINE

## 2024-09-03 ENCOUNTER — APPOINTMENT (OUTPATIENT)
Dept: ULTRASOUND IMAGING | Age: 44
End: 2024-09-03

## 2024-09-03 ENCOUNTER — APPOINTMENT (OUTPATIENT)
Dept: GENERAL RADIOLOGY | Age: 44
End: 2024-09-03

## 2024-09-03 DIAGNOSIS — R74.01 TRANSAMINITIS: Primary | ICD-10-CM

## 2024-09-03 DIAGNOSIS — Q25.72 PULMONARY ARTERIOVENOUS MALFORMATION: ICD-10-CM

## 2024-09-03 LAB
ALBUMIN SERPL-MCNC: 4.4 G/DL (ref 3.5–5.2)
ALP SERPL-CCNC: 115 U/L (ref 35–104)
ALT SERPL-CCNC: 44 U/L (ref 0–32)
ANION GAP SERPL CALCULATED.3IONS-SCNC: 12 MMOL/L (ref 7–16)
AST SERPL-CCNC: 69 U/L (ref 0–31)
BASOPHILS # BLD: 0.04 K/UL (ref 0–0.2)
BASOPHILS NFR BLD: 1 % (ref 0–2)
BILIRUB SERPL-MCNC: 1.9 MG/DL (ref 0–1.2)
BILIRUB UR QL STRIP: ABNORMAL
BUN SERPL-MCNC: 8 MG/DL (ref 6–20)
CALCIUM SERPL-MCNC: 9.3 MG/DL (ref 8.6–10.2)
CHLORIDE SERPL-SCNC: 102 MMOL/L (ref 98–107)
CLARITY UR: CLEAR
CO2 SERPL-SCNC: 24 MMOL/L (ref 22–29)
COLOR UR: YELLOW
CREAT SERPL-MCNC: 0.8 MG/DL (ref 0.5–1)
EOSINOPHIL # BLD: 0.05 K/UL (ref 0.05–0.5)
EOSINOPHILS RELATIVE PERCENT: 1 % (ref 0–6)
EPI CELLS #/AREA URNS HPF: ABNORMAL /HPF
ERYTHROCYTE [DISTWIDTH] IN BLOOD BY AUTOMATED COUNT: 33.6 % (ref 11.5–15)
GFR, ESTIMATED: >90 ML/MIN/1.73M2
GLUCOSE SERPL-MCNC: 120 MG/DL (ref 74–99)
GLUCOSE UR STRIP-MCNC: NEGATIVE MG/DL
HCT VFR BLD AUTO: 29.8 % (ref 34–48)
HETEROPH AB BLD QL IA: NEGATIVE
HGB BLD-MCNC: 8.2 G/DL (ref 11.5–15.5)
HGB UR QL STRIP.AUTO: ABNORMAL
IMM GRANULOCYTES # BLD AUTO: 0.04 K/UL (ref 0–0.58)
IMM GRANULOCYTES NFR BLD: 1 % (ref 0–5)
KETONES UR STRIP-MCNC: ABNORMAL MG/DL
LACTATE BLDV-SCNC: 1.1 MMOL/L (ref 0.5–1.9)
LEUKOCYTE ESTERASE UR QL STRIP: NEGATIVE
LIPASE SERPL-CCNC: 24 U/L (ref 13–60)
LYMPHOCYTES NFR BLD: 0.99 K/UL (ref 1.5–4)
LYMPHOCYTES RELATIVE PERCENT: 16 % (ref 20–42)
MAGNESIUM SERPL-MCNC: 2.2 MG/DL (ref 1.6–2.6)
MCH RBC QN AUTO: 20.3 PG (ref 26–35)
MCHC RBC AUTO-ENTMCNC: 27.5 G/DL (ref 32–34.5)
MCV RBC AUTO: 73.8 FL (ref 80–99.9)
MONOCYTES NFR BLD: 0.81 K/UL (ref 0.1–0.95)
MONOCYTES NFR BLD: 13 % (ref 2–12)
NEUTROPHILS NFR BLD: 69 % (ref 43–80)
NEUTS SEG NFR BLD: 4.3 K/UL (ref 1.8–7.3)
NITRITE UR QL STRIP: NEGATIVE
PH UR STRIP: 6.5 [PH] (ref 5–9)
PLATELET, FLUORESCENCE: 114 K/UL (ref 130–450)
PMV BLD AUTO: ABNORMAL FL (ref 7–12)
POTASSIUM SERPL-SCNC: 3.5 MMOL/L (ref 3.5–5)
PROT SERPL-MCNC: 7.6 G/DL (ref 6.4–8.3)
PROT UR STRIP-MCNC: 100 MG/DL
RBC # BLD AUTO: 4.04 M/UL (ref 3.5–5.5)
RBC # BLD: ABNORMAL 10*6/UL
RBC #/AREA URNS HPF: ABNORMAL /HPF
SARS-COV-2 RDRP RESP QL NAA+PROBE: NOT DETECTED
SODIUM SERPL-SCNC: 138 MMOL/L (ref 132–146)
SP GR UR STRIP: 1.02 (ref 1–1.03)
SPECIMEN DESCRIPTION: NORMAL
SPECIMEN SOURCE: NORMAL
STREP A, MOLECULAR: NEGATIVE
SURGICAL PATHOLOGY REPORT: NORMAL
TROPONIN I SERPL HS-MCNC: <6 NG/L (ref 0–9)
UROBILINOGEN UR STRIP-ACNC: 0.2 EU/DL (ref 0–1)
WBC #/AREA URNS HPF: ABNORMAL /HPF
WBC OTHER # BLD: 6.2 K/UL (ref 4.5–11.5)

## 2024-09-03 PROCEDURE — 87040 BLOOD CULTURE FOR BACTERIA: CPT

## 2024-09-03 PROCEDURE — 83605 ASSAY OF LACTIC ACID: CPT

## 2024-09-03 PROCEDURE — 6370000000 HC RX 637 (ALT 250 FOR IP): Performed by: EMERGENCY MEDICINE

## 2024-09-03 PROCEDURE — 96360 HYDRATION IV INFUSION INIT: CPT

## 2024-09-03 PROCEDURE — 85025 COMPLETE CBC W/AUTO DIFF WBC: CPT

## 2024-09-03 PROCEDURE — 96361 HYDRATE IV INFUSION ADD-ON: CPT

## 2024-09-03 PROCEDURE — 80053 COMPREHEN METABOLIC PANEL: CPT

## 2024-09-03 PROCEDURE — 93005 ELECTROCARDIOGRAM TRACING: CPT

## 2024-09-03 PROCEDURE — 81001 URINALYSIS AUTO W/SCOPE: CPT

## 2024-09-03 PROCEDURE — 83735 ASSAY OF MAGNESIUM: CPT

## 2024-09-03 PROCEDURE — 83690 ASSAY OF LIPASE: CPT

## 2024-09-03 PROCEDURE — 87086 URINE CULTURE/COLONY COUNT: CPT

## 2024-09-03 PROCEDURE — 87635 SARS-COV-2 COVID-19 AMP PRB: CPT

## 2024-09-03 PROCEDURE — 76856 US EXAM PELVIC COMPLETE: CPT

## 2024-09-03 PROCEDURE — 87651 STREP A DNA AMP PROBE: CPT

## 2024-09-03 PROCEDURE — 99285 EMERGENCY DEPT VISIT HI MDM: CPT

## 2024-09-03 PROCEDURE — 2580000003 HC RX 258: Performed by: EMERGENCY MEDICINE

## 2024-09-03 PROCEDURE — 86308 HETEROPHILE ANTIBODY SCREEN: CPT

## 2024-09-03 PROCEDURE — 76705 ECHO EXAM OF ABDOMEN: CPT

## 2024-09-03 PROCEDURE — 71046 X-RAY EXAM CHEST 2 VIEWS: CPT

## 2024-09-03 PROCEDURE — 84484 ASSAY OF TROPONIN QUANT: CPT

## 2024-09-03 RX ORDER — 0.9 % SODIUM CHLORIDE 0.9 %
1000 INTRAVENOUS SOLUTION INTRAVENOUS ONCE
Status: COMPLETED | OUTPATIENT
Start: 2024-09-03 | End: 2024-09-04

## 2024-09-03 RX ORDER — ACETAMINOPHEN 500 MG
1000 TABLET ORAL ONCE
Status: COMPLETED | OUTPATIENT
Start: 2024-09-03 | End: 2024-09-03

## 2024-09-03 RX ADMIN — SODIUM CHLORIDE 1000 ML: 9 INJECTION, SOLUTION INTRAVENOUS at 21:46

## 2024-09-03 RX ADMIN — ACETAMINOPHEN 1000 MG: 500 TABLET ORAL at 21:47

## 2024-09-03 ASSESSMENT — LIFESTYLE VARIABLES
HOW OFTEN DO YOU HAVE A DRINK CONTAINING ALCOHOL: MONTHLY OR LESS
HOW MANY STANDARD DRINKS CONTAINING ALCOHOL DO YOU HAVE ON A TYPICAL DAY: 1 OR 2

## 2024-09-03 ASSESSMENT — PAIN SCALES - GENERAL: PAINLEVEL_OUTOF10: 8

## 2024-09-03 ASSESSMENT — PAIN DESCRIPTION - DESCRIPTORS: DESCRIPTORS: ACHING

## 2024-09-03 ASSESSMENT — PAIN DESCRIPTION - LOCATION: LOCATION: GENERALIZED

## 2024-09-04 ENCOUNTER — APPOINTMENT (OUTPATIENT)
Dept: CT IMAGING | Age: 44
End: 2024-09-04

## 2024-09-04 VITALS
HEIGHT: 59 IN | BODY MASS INDEX: 31.25 KG/M2 | TEMPERATURE: 98.6 F | HEART RATE: 86 BPM | OXYGEN SATURATION: 100 % | DIASTOLIC BLOOD PRESSURE: 88 MMHG | RESPIRATION RATE: 17 BRPM | WEIGHT: 155 LBS | SYSTOLIC BLOOD PRESSURE: 148 MMHG

## 2024-09-04 LAB
B PARAP IS1001 DNA NPH QL NAA+NON-PROBE: NOT DETECTED
B PERT DNA SPEC QL NAA+PROBE: NOT DETECTED
C PNEUM DNA NPH QL NAA+NON-PROBE: NOT DETECTED
FLUAV RNA NPH QL NAA+NON-PROBE: NOT DETECTED
FLUBV RNA NPH QL NAA+NON-PROBE: NOT DETECTED
HADV DNA NPH QL NAA+NON-PROBE: NOT DETECTED
HAV IGM SERPL QL IA: NONREACTIVE
HBV CORE IGM SERPL QL IA: NONREACTIVE
HBV SURFACE AG SERPL QL IA: NONREACTIVE
HCOV 229E RNA NPH QL NAA+NON-PROBE: NOT DETECTED
HCOV HKU1 RNA NPH QL NAA+NON-PROBE: NOT DETECTED
HCOV NL63 RNA NPH QL NAA+NON-PROBE: NOT DETECTED
HCOV OC43 RNA NPH QL NAA+NON-PROBE: NOT DETECTED
HCV AB SERPL QL IA: NONREACTIVE
HMPV RNA NPH QL NAA+NON-PROBE: NOT DETECTED
HPIV1 RNA NPH QL NAA+NON-PROBE: NOT DETECTED
HPIV2 RNA NPH QL NAA+NON-PROBE: NOT DETECTED
HPIV3 RNA NPH QL NAA+NON-PROBE: NOT DETECTED
HPIV4 RNA NPH QL NAA+NON-PROBE: NOT DETECTED
LACTATE BLDV-SCNC: 1.5 MMOL/L (ref 0.5–1.9)
M PNEUMO DNA NPH QL NAA+NON-PROBE: NOT DETECTED
RSV RNA NPH QL NAA+NON-PROBE: NOT DETECTED
RV+EV RNA NPH QL NAA+NON-PROBE: NOT DETECTED
SARS-COV-2 RNA NPH QL NAA+NON-PROBE: NOT DETECTED
SPECIMEN DESCRIPTION: NORMAL
TROPONIN I SERPL HS-MCNC: <6 NG/L (ref 0–9)

## 2024-09-04 PROCEDURE — 6360000004 HC RX CONTRAST MEDICATION: Performed by: RADIOLOGY

## 2024-09-04 PROCEDURE — 80074 ACUTE HEPATITIS PANEL: CPT

## 2024-09-04 PROCEDURE — 84484 ASSAY OF TROPONIN QUANT: CPT

## 2024-09-04 PROCEDURE — 0202U NFCT DS 22 TRGT SARS-COV-2: CPT

## 2024-09-04 PROCEDURE — 83605 ASSAY OF LACTIC ACID: CPT

## 2024-09-04 PROCEDURE — 74177 CT ABD & PELVIS W/CONTRAST: CPT

## 2024-09-04 RX ORDER — IOPAMIDOL 755 MG/ML
75 INJECTION, SOLUTION INTRAVASCULAR
Status: COMPLETED | OUTPATIENT
Start: 2024-09-04 | End: 2024-09-04

## 2024-09-04 RX ADMIN — IOPAMIDOL 75 ML: 755 INJECTION, SOLUTION INTRAVENOUS at 00:23

## 2024-09-04 NOTE — ED NOTES
Radiology Procedure Waiver   Name: Nicolle Hernandez  : 1980  MRN: [id number]    Date:  24    Time: 12:09 AM EDT    Benefits of immediately proceeding with Radiology exam(s) without pre-testing outweigh the risks or are not indicated as specified below and therefore the following is/are being waived:    [x] Pregnancy test   [] Patients LMP on-time and regular.   [] Patient had Tubal Ligation or has other Contraception Device.   [] Patient  is Menopausal or Premenarcheal.    [] Patient had Full or Partial Hysterectomy.    [] Protocol for Iodine allergy    [] MRI Questionnaire     [] BUN/Creatinine   [] Patient age w/no hx of renal dysfunction.   [] Patient on Dialysis.   [] Recent Normal Labs.  Electronically signed by De Jenkins DO on 24 at 12:09 AM TOMT               De Jenkins DO  24 [id number]

## 2024-09-04 NOTE — DISCHARGE INSTRUCTIONS
CT ABDOMEN PELVIS W IV CONTRAST Additional Contrast? None   Final Result   No acute intra-abdominal pelvic process appreciated.      Bulky heterogeneous uterus with multiple lesions, likely fibroids however   leiomyosarcoma cannot be excluded.      Mildly prominent aortocaval and left common iliac nodes are nonspecific.      Suspect a right lower lobe pulmonary AVM.      Additional observations as above.         US GALLBLADDER RUQ   Final Result   Unremarkable right upper quadrant ultrasound.         US PELVIS COMPLETE   Final Result   1.  Heterogeneous and enlarged uterus.  Multi fibroid uterus to include on   mid body uterus measuring at least 9.2 cm.  Endometrium is poorly seen but   appears mildly heterogeneous.  No obvious abnormal vascularity seen on this   study.      2.  Normal appearance of the right ovary.  Nonvisualization of the left   ovary.  There are no adnexal masses.  Normal right ovarian vascularity.         XR CHEST (2 VW)   Final Result   No acute process.

## 2024-09-04 NOTE — ED PROVIDER NOTES
WITH AUTO DIFFERENTIAL - Abnormal; Notable for the following components:    Hemoglobin 8.2 (*)     Hematocrit 29.8 (*)     MCV 73.8 (*)     MCH 20.3 (*)     MCHC 27.5 (*)     RDW 33.6 (*)     Platelet, Fluorescence 114 (*)     Lymphocytes % 16 (*)     Monocytes % 13 (*)     Lymphocytes Absolute 0.99 (*)     All other components within normal limits   COMPREHENSIVE METABOLIC PANEL W/ REFLEX TO MG FOR LOW K - Abnormal; Notable for the following components:    Glucose 120 (*)     Total Bilirubin 1.9 (*)     Alkaline Phosphatase 115 (*)     ALT 44 (*)     AST 69 (*)     All other components within normal limits   COVID-19, RAPID   RAPID STREP SCREEN   CULTURE, BLOOD 1   CULTURE, BLOOD 2   CULTURE, URINE   RESPIRATORY PANEL, MOLECULAR, WITH COVID-19   TROPONIN   LACTATE, SEPSIS   LACTATE, SEPSIS   LIPASE   MONONUCLEOSIS SCREEN   MAGNESIUM   TROPONIN   HEPATITIS PANEL, ACUTE       As interpreted by me, the above displayed labs are abnormal. All other labs obtained during this visit were within normal range or not returned as of this dictation.      EKG Interpretation  Interpreted by emergency department physician, Geena David, DO      Normal sinus rhythm with a ventricular rate of 89 bpm, W[place] interval normal at 140 ms, QTc of 433 ms.  T wave inversions noted in leads III and V1.  No evidence of STEMI.         RADIOLOGY:   Non-plain film images such as CT, Ultrasound and MRI are read by the radiologist. Plain radiographic images are visualized and preliminarily interpreted by the ED Provider with the below findings:      Interpretation per the Radiologist below, if available at the time of this note:    CT ABDOMEN PELVIS W IV CONTRAST Additional Contrast? None   Final Result   No acute intra-abdominal pelvic process appreciated.      Bulky heterogeneous uterus with multiple lesions, likely fibroids however   leiomyosarcoma cannot be excluded.      Mildly prominent aortocaval and left common iliac nodes are nonspecific.

## 2024-09-05 LAB
MICROORGANISM SPEC CULT: ABNORMAL
SERVICE CMNT-IMP: ABNORMAL
SPECIMEN DESCRIPTION: ABNORMAL

## 2024-09-06 LAB
EKG ATRIAL RATE: 89 BPM
EKG P AXIS: 18 DEGREES
EKG P-R INTERVAL: 140 MS
EKG Q-T INTERVAL: 356 MS
EKG QRS DURATION: 78 MS
EKG QTC CALCULATION (BAZETT): 433 MS
EKG R AXIS: 32 DEGREES
EKG T AXIS: 25 DEGREES
EKG VENTRICULAR RATE: 89 BPM

## 2024-09-06 PROCEDURE — 93010 ELECTROCARDIOGRAM REPORT: CPT | Performed by: INTERNAL MEDICINE

## 2024-09-08 LAB
MICROORGANISM SPEC CULT: NORMAL
MICROORGANISM SPEC CULT: NORMAL
SERVICE CMNT-IMP: NORMAL
SERVICE CMNT-IMP: NORMAL
SPECIMEN DESCRIPTION: NORMAL
SPECIMEN DESCRIPTION: NORMAL

## 2024-09-11 ENCOUNTER — TELEPHONE (OUTPATIENT)
Dept: INFUSION THERAPY | Age: 44
End: 2024-09-11

## 2024-09-26 ENCOUNTER — TELEPHONE (OUTPATIENT)
Dept: INFUSION THERAPY | Age: 44
End: 2024-09-26

## (undated) DEVICE — GAUZE,SPONGE,4"X4",16PLY,XRAY,STRL,LF: Brand: MEDLINE

## (undated) DEVICE — GLOVE ORANGE PI 8   MSG9080

## (undated) DEVICE — VAGINAL PREP TRAY: Brand: MEDLINE INDUSTRIES, INC.

## (undated) DEVICE — SOLUTION IRRIG 3000ML 0.9% SOD CHL USP UROMATIC PLAS CONT

## (undated) DEVICE — Y-TYPE TUR/BLADDER IRRIGATION SET, REGULATING CLAMP

## (undated) DEVICE — MARKER,SKIN,WI/RULER AND LABELS: Brand: MEDLINE

## (undated) DEVICE — BAG PRSS INFUS IV OR ART 3000 CC W STPCOCK NO THUMBWHEEL

## (undated) DEVICE — DRAPE,REIN 53X77,STERILE: Brand: MEDLINE

## (undated) DEVICE — PAD,SANITARY,11 IN,MAXI,N-STRL,IND WRAP: Brand: MEDLINE

## (undated) DEVICE — JELLY,LUBE,STERILE,FLIP TOP,TUBE,2-OZ: Brand: MEDLINE

## (undated) DEVICE — DOUBLE BASIN SET: Brand: MEDLINE INDUSTRIES, INC.

## (undated) DEVICE — TOWEL,OR,DSP,ST,BLUE,STD,6/PK,12PK/CS: Brand: MEDLINE

## (undated) DEVICE — LEGGINGS, PAIR, 31X48, STERILE: Brand: MEDLINE